# Patient Record
Sex: MALE | Race: WHITE | NOT HISPANIC OR LATINO | URBAN - METROPOLITAN AREA
[De-identification: names, ages, dates, MRNs, and addresses within clinical notes are randomized per-mention and may not be internally consistent; named-entity substitution may affect disease eponyms.]

---

## 2024-09-26 ENCOUNTER — HOSPITAL ENCOUNTER (EMERGENCY)
Facility: HOSPITAL | Age: 44
Discharge: OTHER INSTITUTION | End: 2024-09-26
Attending: EMERGENCY MEDICINE
Payer: COMMERCIAL

## 2024-09-26 ENCOUNTER — APPOINTMENT (OUTPATIENT)
Dept: CARDIOLOGY | Facility: HOSPITAL | Age: 44
End: 2024-09-26
Payer: COMMERCIAL

## 2024-09-26 ENCOUNTER — HOSPITAL ENCOUNTER (INPATIENT)
Facility: HOSPITAL | Age: 44
End: 2024-09-26
Attending: PSYCHIATRY & NEUROLOGY | Admitting: PSYCHIATRY & NEUROLOGY
Payer: COMMERCIAL

## 2024-09-26 ENCOUNTER — HOSPITAL ENCOUNTER (INPATIENT)
Facility: HOSPITAL | Age: 44
DRG: 885 | End: 2024-09-26
Attending: PSYCHIATRY & NEUROLOGY | Admitting: PSYCHIATRY & NEUROLOGY
Payer: COMMERCIAL

## 2024-09-26 ENCOUNTER — APPOINTMENT (OUTPATIENT)
Dept: RADIOLOGY | Facility: HOSPITAL | Age: 44
End: 2024-09-26
Payer: COMMERCIAL

## 2024-09-26 VITALS
HEIGHT: 72 IN | WEIGHT: 220 LBS | BODY MASS INDEX: 29.8 KG/M2 | DIASTOLIC BLOOD PRESSURE: 89 MMHG | RESPIRATION RATE: 16 BRPM | SYSTOLIC BLOOD PRESSURE: 124 MMHG | HEART RATE: 81 BPM | TEMPERATURE: 97.3 F | OXYGEN SATURATION: 96 %

## 2024-09-26 DIAGNOSIS — F22 PARANOIA (MULTI): ICD-10-CM

## 2024-09-26 DIAGNOSIS — F43.12 CHRONIC POST-TRAUMATIC STRESS DISORDER: ICD-10-CM

## 2024-09-26 DIAGNOSIS — F17.290 OTHER TOBACCO PRODUCT NICOTINE DEPENDENCE, UNCOMPLICATED: ICD-10-CM

## 2024-09-26 DIAGNOSIS — F22 PARANOIA (MULTI): Primary | ICD-10-CM

## 2024-09-26 DIAGNOSIS — F13.20 BENZODIAZEPINE DEPENDENCE (MULTI): Primary | ICD-10-CM

## 2024-09-26 LAB
ALBUMIN SERPL BCP-MCNC: 4.7 G/DL (ref 3.4–5)
ALP SERPL-CCNC: 43 U/L (ref 33–120)
ALT SERPL W P-5'-P-CCNC: 49 U/L (ref 10–52)
AMPHETAMINES UR QL SCN: ABNORMAL
ANION GAP SERPL CALC-SCNC: 21 MMOL/L (ref 10–20)
APAP SERPL-MCNC: <10 UG/ML
AST SERPL W P-5'-P-CCNC: 135 U/L (ref 9–39)
BARBITURATES UR QL SCN: ABNORMAL
BASOPHILS # BLD AUTO: 0.05 X10*3/UL (ref 0–0.1)
BASOPHILS NFR BLD AUTO: 0.5 %
BENZODIAZ UR QL SCN: ABNORMAL
BILIRUB SERPL-MCNC: 1.2 MG/DL (ref 0–1.2)
BUN SERPL-MCNC: 8 MG/DL (ref 6–23)
BZE UR QL SCN: ABNORMAL
CALCIUM SERPL-MCNC: 8.8 MG/DL (ref 8.6–10.3)
CANNABINOIDS UR QL SCN: ABNORMAL
CARDIAC TROPONIN I PNL SERPL HS: 10 NG/L (ref 0–20)
CARDIAC TROPONIN I PNL SERPL HS: 9 NG/L (ref 0–20)
CHLORIDE SERPL-SCNC: 101 MMOL/L (ref 98–107)
CO2 SERPL-SCNC: 18 MMOL/L (ref 21–32)
CREAT SERPL-MCNC: 0.85 MG/DL (ref 0.5–1.3)
EGFRCR SERPLBLD CKD-EPI 2021: >90 ML/MIN/1.73M*2
EOSINOPHIL # BLD AUTO: 0.05 X10*3/UL (ref 0–0.7)
EOSINOPHIL NFR BLD AUTO: 0.5 %
ERYTHROCYTE [DISTWIDTH] IN BLOOD BY AUTOMATED COUNT: 12.2 % (ref 11.5–14.5)
ETHANOL SERPL-MCNC: <10 MG/DL
FENTANYL+NORFENTANYL UR QL SCN: ABNORMAL
GLUCOSE SERPL-MCNC: 97 MG/DL (ref 74–99)
HCT VFR BLD AUTO: 40 % (ref 41–52)
HGB BLD-MCNC: 13.7 G/DL (ref 13.5–17.5)
IMM GRANULOCYTES # BLD AUTO: 0.03 X10*3/UL (ref 0–0.7)
IMM GRANULOCYTES NFR BLD AUTO: 0.3 % (ref 0–0.9)
LYMPHOCYTES # BLD AUTO: 2.1 X10*3/UL (ref 1.2–4.8)
LYMPHOCYTES NFR BLD AUTO: 21.5 %
MAGNESIUM SERPL-MCNC: 1.8 MG/DL (ref 1.6–2.4)
MCH RBC QN AUTO: 29.2 PG (ref 26–34)
MCHC RBC AUTO-ENTMCNC: 34.3 G/DL (ref 32–36)
MCV RBC AUTO: 85 FL (ref 80–100)
METHADONE UR QL SCN: ABNORMAL
MONOCYTES # BLD AUTO: 1.21 X10*3/UL (ref 0.1–1)
MONOCYTES NFR BLD AUTO: 12.4 %
NEUTROPHILS # BLD AUTO: 6.35 X10*3/UL (ref 1.2–7.7)
NEUTROPHILS NFR BLD AUTO: 64.8 %
NRBC BLD-RTO: 0 /100 WBCS (ref 0–0)
OPIATES UR QL SCN: ABNORMAL
OXYCODONE+OXYMORPHONE UR QL SCN: ABNORMAL
PCP UR QL SCN: ABNORMAL
PLATELET # BLD AUTO: 214 X10*3/UL (ref 150–450)
POTASSIUM SERPL-SCNC: 2.9 MMOL/L (ref 3.5–5.3)
POTASSIUM SERPL-SCNC: 3.7 MMOL/L (ref 3.5–5.3)
PROT SERPL-MCNC: 6.6 G/DL (ref 6.4–8.2)
RBC # BLD AUTO: 4.69 X10*6/UL (ref 4.5–5.9)
SALICYLATES SERPL-MCNC: <3 MG/DL
SODIUM SERPL-SCNC: 137 MMOL/L (ref 136–145)
TSH SERPL-ACNC: 0.85 MIU/L (ref 0.44–3.98)
WBC # BLD AUTO: 9.8 X10*3/UL (ref 4.4–11.3)

## 2024-09-26 PROCEDURE — 70450 CT HEAD/BRAIN W/O DYE: CPT

## 2024-09-26 PROCEDURE — 85025 COMPLETE CBC W/AUTO DIFF WBC: CPT | Performed by: EMERGENCY MEDICINE

## 2024-09-26 PROCEDURE — 2500000001 HC RX 250 WO HCPCS SELF ADMINISTERED DRUGS (ALT 637 FOR MEDICARE OP): Performed by: EMERGENCY MEDICINE

## 2024-09-26 PROCEDURE — 36415 COLL VENOUS BLD VENIPUNCTURE: CPT | Performed by: EMERGENCY MEDICINE

## 2024-09-26 PROCEDURE — 84484 ASSAY OF TROPONIN QUANT: CPT | Performed by: EMERGENCY MEDICINE

## 2024-09-26 PROCEDURE — 99285 EMERGENCY DEPT VISIT HI MDM: CPT | Mod: 25

## 2024-09-26 PROCEDURE — 70450 CT HEAD/BRAIN W/O DYE: CPT | Performed by: RADIOLOGY

## 2024-09-26 PROCEDURE — 96366 THER/PROPH/DIAG IV INF ADDON: CPT

## 2024-09-26 PROCEDURE — 93005 ELECTROCARDIOGRAM TRACING: CPT

## 2024-09-26 PROCEDURE — 99285 EMERGENCY DEPT VISIT HI MDM: CPT | Performed by: EMERGENCY MEDICINE

## 2024-09-26 PROCEDURE — 80307 DRUG TEST PRSMV CHEM ANLYZR: CPT | Performed by: EMERGENCY MEDICINE

## 2024-09-26 PROCEDURE — 83735 ASSAY OF MAGNESIUM: CPT | Performed by: EMERGENCY MEDICINE

## 2024-09-26 PROCEDURE — 2500000004 HC RX 250 GENERAL PHARMACY W/ HCPCS (ALT 636 FOR OP/ED): Performed by: EMERGENCY MEDICINE

## 2024-09-26 PROCEDURE — 1240000001 HC SEMI-PRIVATE BH ROOM DAILY

## 2024-09-26 PROCEDURE — 84443 ASSAY THYROID STIM HORMONE: CPT | Performed by: EMERGENCY MEDICINE

## 2024-09-26 PROCEDURE — 84075 ASSAY ALKALINE PHOSPHATASE: CPT | Performed by: EMERGENCY MEDICINE

## 2024-09-26 PROCEDURE — 84132 ASSAY OF SERUM POTASSIUM: CPT | Performed by: EMERGENCY MEDICINE

## 2024-09-26 PROCEDURE — 96365 THER/PROPH/DIAG IV INF INIT: CPT

## 2024-09-26 PROCEDURE — 93010 ELECTROCARDIOGRAM REPORT: CPT | Performed by: EMERGENCY MEDICINE

## 2024-09-26 PROCEDURE — 96375 TX/PRO/DX INJ NEW DRUG ADDON: CPT

## 2024-09-26 PROCEDURE — 80320 DRUG SCREEN QUANTALCOHOLS: CPT | Performed by: EMERGENCY MEDICINE

## 2024-09-26 RX ORDER — POLYETHYLENE GLYCOL 3350 17 G/17G
17 POWDER, FOR SOLUTION ORAL DAILY PRN
Status: DISCONTINUED | OUTPATIENT
Start: 2024-09-26 | End: 2024-09-30 | Stop reason: HOSPADM

## 2024-09-26 RX ORDER — ALUMINUM HYDROXIDE, MAGNESIUM HYDROXIDE, AND SIMETHICONE 1200; 120; 1200 MG/30ML; MG/30ML; MG/30ML
30 SUSPENSION ORAL EVERY 6 HOURS PRN
Status: DISCONTINUED | OUTPATIENT
Start: 2024-09-26 | End: 2024-09-30 | Stop reason: HOSPADM

## 2024-09-26 RX ORDER — TRAZODONE HYDROCHLORIDE 50 MG/1
75 TABLET ORAL NIGHTLY PRN
Status: DISCONTINUED | OUTPATIENT
Start: 2024-09-26 | End: 2024-09-27

## 2024-09-26 RX ORDER — HALOPERIDOL 5 MG/ML
5 INJECTION INTRAMUSCULAR EVERY 6 HOURS PRN
Status: DISCONTINUED | OUTPATIENT
Start: 2024-09-26 | End: 2024-09-30 | Stop reason: HOSPADM

## 2024-09-26 RX ORDER — HALOPERIDOL 5 MG/1
5 TABLET ORAL EVERY 6 HOURS PRN
Status: DISCONTINUED | OUTPATIENT
Start: 2024-09-26 | End: 2024-09-30 | Stop reason: HOSPADM

## 2024-09-26 RX ORDER — ALPRAZOLAM 1 MG/1
1 TABLET ORAL ONCE
Status: COMPLETED | OUTPATIENT
Start: 2024-09-26 | End: 2024-09-26

## 2024-09-26 RX ORDER — HALOPERIDOL 5 MG/ML
10 INJECTION INTRAMUSCULAR EVERY 6 HOURS PRN
Status: DISCONTINUED | OUTPATIENT
Start: 2024-09-26 | End: 2024-09-30 | Stop reason: HOSPADM

## 2024-09-26 RX ORDER — HYDROXYZINE HYDROCHLORIDE 25 MG/1
50 TABLET, FILM COATED ORAL EVERY 6 HOURS PRN
Status: DISCONTINUED | OUTPATIENT
Start: 2024-09-26 | End: 2024-09-30 | Stop reason: HOSPADM

## 2024-09-26 RX ORDER — ACETAMINOPHEN 500 MG
5 TABLET ORAL NIGHTLY PRN
Status: DISCONTINUED | OUTPATIENT
Start: 2024-09-26 | End: 2024-09-30 | Stop reason: HOSPADM

## 2024-09-26 RX ORDER — POTASSIUM CHLORIDE 14.9 MG/ML
20 INJECTION INTRAVENOUS ONCE
Status: COMPLETED | OUTPATIENT
Start: 2024-09-26 | End: 2024-09-26

## 2024-09-26 RX ORDER — DIPHENHYDRAMINE HCL 50 MG
50 CAPSULE ORAL EVERY 6 HOURS PRN
Status: DISCONTINUED | OUTPATIENT
Start: 2024-09-26 | End: 2024-09-30 | Stop reason: HOSPADM

## 2024-09-26 RX ORDER — HALOPERIDOL 5 MG/1
10 TABLET ORAL EVERY 6 HOURS PRN
Status: DISCONTINUED | OUTPATIENT
Start: 2024-09-26 | End: 2024-09-30 | Stop reason: HOSPADM

## 2024-09-26 RX ORDER — HALOPERIDOL 5 MG/ML
5 INJECTION INTRAMUSCULAR ONCE
Status: DISCONTINUED | OUTPATIENT
Start: 2024-09-26 | End: 2024-09-26 | Stop reason: HOSPADM

## 2024-09-26 RX ORDER — MICONAZOLE NITRATE 2 %
4 CREAM (GRAM) TOPICAL EVERY 2 HOUR PRN
Status: DISCONTINUED | OUTPATIENT
Start: 2024-09-26 | End: 2024-09-30 | Stop reason: HOSPADM

## 2024-09-26 RX ORDER — IBUPROFEN 600 MG/1
600 TABLET ORAL EVERY 8 HOURS PRN
Status: DISCONTINUED | OUTPATIENT
Start: 2024-09-26 | End: 2024-09-30 | Stop reason: HOSPADM

## 2024-09-26 RX ORDER — DOCUSATE SODIUM 100 MG/1
100 CAPSULE, LIQUID FILLED ORAL 2 TIMES DAILY PRN
Status: DISCONTINUED | OUTPATIENT
Start: 2024-09-26 | End: 2024-09-30 | Stop reason: HOSPADM

## 2024-09-26 RX ORDER — DIPHENHYDRAMINE HYDROCHLORIDE 50 MG/ML
50 INJECTION INTRAMUSCULAR; INTRAVENOUS ONCE AS NEEDED
Status: DISCONTINUED | OUTPATIENT
Start: 2024-09-26 | End: 2024-09-30 | Stop reason: HOSPADM

## 2024-09-26 RX ORDER — LORAZEPAM 2 MG/ML
2 INJECTION INTRAMUSCULAR ONCE
Status: COMPLETED | OUTPATIENT
Start: 2024-09-26 | End: 2024-09-26

## 2024-09-26 RX ORDER — LORAZEPAM 1 MG/1
2 TABLET ORAL EVERY 6 HOURS PRN
Status: DISCONTINUED | OUTPATIENT
Start: 2024-09-26 | End: 2024-09-27

## 2024-09-26 RX ORDER — LORAZEPAM 2 MG/ML
2 INJECTION INTRAMUSCULAR EVERY 6 HOURS PRN
Status: DISCONTINUED | OUTPATIENT
Start: 2024-09-26 | End: 2024-09-27

## 2024-09-26 SDOH — HEALTH STABILITY: MENTAL HEALTH: HAVE YOU EVER TRIED TO KILL YOURSELF?: NO

## 2024-09-26 SDOH — SOCIAL STABILITY: SOCIAL INSECURITY
WITHIN THE LAST YEAR, HAVE YOU BEEN RAPED OR FORCED TO HAVE ANY KIND OF SEXUAL ACTIVITY BY YOUR PARTNER OR EX-PARTNER?: NO

## 2024-09-26 SDOH — SOCIAL STABILITY: SOCIAL NETWORK: HOW OFTEN DO YOU GET TOGETHER WITH FRIENDS OR RELATIVES?: MORE THAN THREE TIMES A WEEK

## 2024-09-26 SDOH — HEALTH STABILITY: MENTAL HEALTH: HAVE YOU ACTUALLY HAD ANY THOUGHTS OF KILLING YOURSELF?: NO

## 2024-09-26 SDOH — HEALTH STABILITY: PHYSICAL HEALTH
HOW OFTEN DO YOU NEED TO HAVE SOMEONE HELP YOU WHEN YOU READ INSTRUCTIONS, PAMPHLETS, OR OTHER WRITTEN MATERIAL FROM YOUR DOCTOR OR PHARMACY?: NEVER

## 2024-09-26 SDOH — HEALTH STABILITY: MENTAL HEALTH: BEHAVIORAL HEALTH(WDL): EXCEPTIONS TO WDL

## 2024-09-26 SDOH — HEALTH STABILITY: MENTAL HEALTH: BEHAVIORAL HEALTH(WDL): WITHIN DEFINED LIMITS

## 2024-09-26 SDOH — HEALTH STABILITY: MENTAL HEALTH: IN THE PAST WEEK, HAVE YOU BEEN HAVING THOUGHTS ABOUT KILLING YOURSELF?: NO

## 2024-09-26 SDOH — HEALTH STABILITY: MENTAL HEALTH: DELUSIONS: PARANOID

## 2024-09-26 SDOH — HEALTH STABILITY: MENTAL HEALTH
OTHER SUICIDE PRECAUTIONS INCLUDE: PATIENT PLACED IN AN EASILY OBSERVABLE ROOM WITH DOOR/CURTAIN REMAINING OPEN;PATIENT PLACED IN GOWN (SNAPS OR PAPER GOWNS PREFERRED) AND WANDED;REMAINING RISKS IDENTIFIED AND MITIGATED;PATIENT PLACED IN PSYCH SAFE ROOM (IF AVAILABLE);PROVIDER NOTIFIED;EL

## 2024-09-26 SDOH — SOCIAL STABILITY: SOCIAL INSECURITY: WITHIN THE LAST YEAR, HAVE YOU BEEN HUMILIATED OR EMOTIONALLY ABUSED IN OTHER WAYS BY YOUR PARTNER OR EX-PARTNER?: NO

## 2024-09-26 SDOH — HEALTH STABILITY: MENTAL HEALTH: DELUSIONS: OTHER (COMMENT)

## 2024-09-26 SDOH — HEALTH STABILITY: PHYSICAL HEALTH: ON AVERAGE, HOW MANY MINUTES DO YOU ENGAGE IN EXERCISE AT THIS LEVEL?: 0 MIN

## 2024-09-26 SDOH — HEALTH STABILITY: MENTAL HEALTH: BEHAVIORS/MOOD: SLEEPING

## 2024-09-26 SDOH — HEALTH STABILITY: MENTAL HEALTH: SLEEP PATTERN: INSOMNIA;DIFFICULTY FALLING ASLEEP

## 2024-09-26 SDOH — ECONOMIC STABILITY: FOOD INSECURITY: WITHIN THE PAST 12 MONTHS, YOU WORRIED THAT YOUR FOOD WOULD RUN OUT BEFORE YOU GOT MONEY TO BUY MORE.: NEVER TRUE

## 2024-09-26 SDOH — SOCIAL STABILITY: SOCIAL NETWORK
IN A TYPICAL WEEK, HOW MANY TIMES DO YOU TALK ON THE PHONE WITH FAMILY, FRIENDS, OR NEIGHBORS?: MORE THAN THREE TIMES A WEEK

## 2024-09-26 SDOH — SOCIAL STABILITY: SOCIAL NETWORK: HOW OFTEN DO YOU ATTEND MEETINGS OF THE CLUBS OR ORGANIZATIONS YOU BELONG TO?: MORE THAN 4 TIMES PER YEAR

## 2024-09-26 SDOH — SOCIAL STABILITY: SOCIAL INSECURITY
WITHIN THE LAST YEAR, HAVE YOU BEEN KICKED, HIT, SLAPPED, OR OTHERWISE PHYSICALLY HURT BY YOUR PARTNER OR EX-PARTNER?: NO

## 2024-09-26 SDOH — ECONOMIC STABILITY: INCOME INSECURITY: IN THE PAST 12 MONTHS, HAS THE ELECTRIC, GAS, OIL, OR WATER COMPANY THREATENED TO SHUT OFF SERVICE IN YOUR HOME?: NO

## 2024-09-26 SDOH — HEALTH STABILITY: MENTAL HEALTH: SUICIDE ASSESSMENT: ADULT (C-SSRS)

## 2024-09-26 SDOH — HEALTH STABILITY: MENTAL HEALTH: IN THE PAST FEW WEEKS, HAVE YOU FELT THAT YOU OR YOUR FAMILY WOULD BE BETTER OFF IF YOU WERE DEAD?: NO

## 2024-09-26 SDOH — SOCIAL STABILITY: SOCIAL NETWORK: HOW OFTEN DO YOU ATTEND CHURCH OR RELIGIOUS SERVICES?: MORE THAN 4 TIMES PER YEAR

## 2024-09-26 SDOH — SOCIAL STABILITY: SOCIAL NETWORK: HOW OFTEN DO YOU ATTENT MEETINGS OF THE CLUB OR ORGANIZATION YOU BELONG TO?: MORE THAN 4 TIMES PER YEAR

## 2024-09-26 SDOH — HEALTH STABILITY: MENTAL HEALTH: HAVE YOU WISHED YOU WERE DEAD OR WISHED YOU COULD GO TO SLEEP AND NOT WAKE UP?: NO

## 2024-09-26 SDOH — ECONOMIC STABILITY: FOOD INSECURITY: WITHIN THE PAST 12 MONTHS, THE FOOD YOU BOUGHT JUST DIDN'T LAST AND YOU DIDN'T HAVE MONEY TO GET MORE.: NEVER TRUE

## 2024-09-26 SDOH — HEALTH STABILITY: MENTAL HEALTH: ARE YOU HAVING THOUGHTS OF KILLING YOURSELF RIGHT NOW?: NO

## 2024-09-26 SDOH — HEALTH STABILITY: MENTAL HEALTH: IN THE PAST FEW WEEKS, HAVE YOU WISHED YOU WERE DEAD?: NO

## 2024-09-26 SDOH — HEALTH STABILITY: MENTAL HEALTH: HAVE YOU EVER DONE ANYTHING, STARTED TO DO ANYTHING, OR PREPARED TO DO ANYTHING TO END YOUR LIFE?: NO

## 2024-09-26 SDOH — HEALTH STABILITY: MENTAL HEALTH
DO YOU FEEL STRESS - TENSE, RESTLESS, NERVOUS, OR ANXIOUS, OR UNABLE TO SLEEP AT NIGHT BECAUSE YOUR MIND IS TROUBLED ALL THE TIME - THESE DAYS?: TO SOME EXTENT

## 2024-09-26 SDOH — HEALTH STABILITY: MENTAL HEALTH

## 2024-09-26 SDOH — HEALTH STABILITY: MENTAL HEALTH: BEHAVIORS/MOOD: CALM

## 2024-09-26 SDOH — HEALTH STABILITY: MENTAL HEALTH: CONTENT: UNREMARKABLE

## 2024-09-26 SDOH — SOCIAL STABILITY: SOCIAL NETWORK
DO YOU BELONG TO ANY CLUBS OR ORGANIZATIONS SUCH AS CHURCH GROUPS UNIONS, FRATERNAL OR ATHLETIC GROUPS, OR SCHOOL GROUPS?: YES

## 2024-09-26 SDOH — HEALTH STABILITY: MENTAL HEALTH: NEEDS EXPRESSED: DENIES

## 2024-09-26 SDOH — HEALTH STABILITY: MENTAL HEALTH
STRESS IS WHEN SOMEONE FEELS TENSE, NERVOUS, ANXIOUS, OR CAN'T SLEEP AT NIGHT BECAUSE THEIR MIND IS TROUBLED. HOW STRESSED ARE YOU?: TO SOME EXTENT

## 2024-09-26 SDOH — HEALTH STABILITY: MENTAL HEALTH: BEHAVIORS/MOOD: DELUSIONS;FLIGHT OF IDEAS;HALLUCINATIONS;IMPULSIVE;PARANOID;RESTLESS

## 2024-09-26 SDOH — SOCIAL STABILITY: SOCIAL INSECURITY: WITHIN THE LAST YEAR, HAVE YOU BEEN AFRAID OF YOUR PARTNER OR EX-PARTNER?: NO

## 2024-09-26 SDOH — SOCIAL STABILITY: SOCIAL NETWORK
DO YOU BELONG TO ANY CLUBS OR ORGANIZATIONS SUCH AS CHURCH GROUPS, UNIONS, FRATERNAL OR ATHLETIC GROUPS, OR SCHOOL GROUPS?: YES

## 2024-09-26 SDOH — SOCIAL STABILITY: SOCIAL INSECURITY: ARE YOU MARRIED, WIDOWED, DIVORCED, SEPARATED, NEVER MARRIED, OR LIVING WITH A PARTNER?: NEVER MARRIED

## 2024-09-26 SDOH — SOCIAL STABILITY: SOCIAL NETWORK: PARENT/GUARDIAN/SIGNIFICANT OTHER INVOLVEMENT: NO INVOLVEMENT

## 2024-09-26 SDOH — HEALTH STABILITY: MENTAL HEALTH: SLEEP PATTERN: DIFFICULTY FALLING ASLEEP

## 2024-09-26 SDOH — ECONOMIC STABILITY: INCOME INSECURITY: IN THE PAST 12 MONTHS HAS THE ELECTRIC, GAS, OIL, OR WATER COMPANY THREATENED TO SHUT OFF SERVICES IN YOUR HOME?: NO

## 2024-09-26 SDOH — SOCIAL STABILITY: SOCIAL INSECURITY: FAMILY BEHAVIORS: UNABLE TO ASSESS

## 2024-09-26 SDOH — SOCIAL STABILITY: SOCIAL NETWORK: ARE YOU MARRIED, WIDOWED, DIVORCED, SEPARATED, NEVER MARRIED, OR LIVING WITH A PARTNER?: NEVER MARRIED

## 2024-09-26 SDOH — ECONOMIC STABILITY: FOOD INSECURITY: WITHIN THE PAST 12 MONTHS, YOU WORRIED THAT YOUR FOOD WOULD RUN OUT BEFORE YOU GOT THE MONEY TO BUY MORE.: NEVER TRUE

## 2024-09-26 SDOH — ECONOMIC STABILITY: HOUSING INSECURITY: FEELS SAFE LIVING IN HOME: NO

## 2024-09-26 SDOH — HEALTH STABILITY: PHYSICAL HEALTH: ON AVERAGE, HOW MANY DAYS PER WEEK DO YOU ENGAGE IN MODERATE TO STRENUOUS EXERCISE (LIKE A BRISK WALK)?: 0 DAYS

## 2024-09-26 SDOH — HEALTH STABILITY: MENTAL HEALTH: FOR HIGH RISK PATIENTS: 1:1 PATIENT OBSERVER AT ALL TIMES;ALL INTERVENTIONS ABOVE, PLUS:

## 2024-09-26 SDOH — HEALTH STABILITY: MENTAL HEALTH: CONTENT: DELUSIONS;OBSESSIONS;PHOBIAS;MAGICAL THINKING

## 2024-09-26 SDOH — HEALTH STABILITY: MENTAL HEALTH: DEPRESSION SYMPTOMS: SLEEP DISTURBANCE;APPETITE CHANGE

## 2024-09-26 SDOH — HEALTH STABILITY: MENTAL HEALTH: SUICIDAL BEHAVIOR (LIFETIME): NO

## 2024-09-26 SDOH — HEALTH STABILITY: MENTAL HEALTH: NON-SPECIFIC ACTIVE SUICIDAL THOUGHTS (PAST 1 MONTH): NO

## 2024-09-26 SDOH — SOCIAL STABILITY: SOCIAL NETWORK: VISITOR BEHAVIORS: UNABLE TO ASSESS

## 2024-09-26 SDOH — HEALTH STABILITY: MENTAL HEALTH: BEHAVIORS/MOOD: ANXIOUS;PARANOID

## 2024-09-26 SDOH — SOCIAL STABILITY: SOCIAL NETWORK: EMOTIONAL SUPPORT GIVEN: REASSURE

## 2024-09-26 SDOH — HEALTH STABILITY: MENTAL HEALTH: CONTENT: PREOCCUPATION

## 2024-09-26 SDOH — HEALTH STABILITY: MENTAL HEALTH: ANXIETY SYMPTOMS: GENERALIZED;PANIC ATTACK

## 2024-09-26 SDOH — HEALTH STABILITY: MENTAL HEALTH: BEHAVIORS/MOOD: AGITATED;FLIGHT OF IDEAS

## 2024-09-26 SDOH — HEALTH STABILITY: MENTAL HEALTH: WISH TO BE DEAD (PAST 1 MONTH): NO

## 2024-09-26 SDOH — HEALTH STABILITY: MENTAL HEALTH: BEHAVIORS/MOOD: ANXIOUS

## 2024-09-26 SDOH — SOCIAL STABILITY: SOCIAL INSECURITY
WITHIN THE LAST YEAR, HAVE TO BEEN RAPED OR FORCED TO HAVE ANY KIND OF SEXUAL ACTIVITY BY YOUR PARTNER OR EX-PARTNER?: NO

## 2024-09-26 ASSESSMENT — PAIN - FUNCTIONAL ASSESSMENT
PAIN_FUNCTIONAL_ASSESSMENT: 0-10

## 2024-09-26 ASSESSMENT — LIFESTYLE VARIABLES
EVER FELT BAD OR GUILTY ABOUT YOUR DRINKING: NO
AUDITORY DISTURBANCES: NOT PRESENT
SUBSTANCE_ABUSE_PAST_12_MONTHS: YES
HAVE PEOPLE ANNOYED YOU BY CRITICIZING YOUR DRINKING: NO
VISUAL DISTURBANCES: NOT PRESENT
ANXIETY: NO ANXIETY, AT EASE
NAUSEA AND VOMITING: NO NAUSEA AND NO VOMITING
AGITATION: NORMAL ACTIVITY
PRESCIPTION_ABUSE_PAST_12_MONTHS: YES
CIWA TOTAL SCORE: 0
TREMOR: NO TREMOR
HEADACHE, FULLNESS IN HEAD: NOT PRESENT
TOTAL_SCORE: 0
EVER HAD A DRINK FIRST THING IN THE MORNING TO STEADY YOUR NERVES TO GET RID OF A HANGOVER: NO
BLOOD PRESSURE: 132/85
HAVE YOU EVER FELT YOU SHOULD CUT DOWN ON YOUR DRINKING: NO
PAROXYSMAL SWEATS: NO SWEAT VISIBLE
PULSE: 64
TOTAL SCORE: 0
ORIENTATION AND CLOUDING OF SENSORIUM: ORIENTED AND CAN DO SERIAL ADDITIONS

## 2024-09-26 ASSESSMENT — ACTIVITIES OF DAILY LIVING (ADL)
PATIENT'S MEMORY ADEQUATE TO SAFELY COMPLETE DAILY ACTIVITIES?: YES
GROOMING: INDEPENDENT
HEARING - LEFT EAR: FUNCTIONAL
FEEDING YOURSELF: INDEPENDENT
WALKS IN HOME: INDEPENDENT
TOILETING: INDEPENDENT
LACK_OF_TRANSPORTATION: YES
BATHING: INDEPENDENT
ADEQUATE_TO_COMPLETE_ADL: YES
DRESSING YOURSELF: INDEPENDENT
HEARING - RIGHT EAR: FUNCTIONAL
JUDGMENT_ADEQUATE_SAFELY_COMPLETE_DAILY_ACTIVITIES: YES

## 2024-09-26 ASSESSMENT — PAIN SCALES - GENERAL
PAINLEVEL_OUTOF10: 0 - NO PAIN

## 2024-09-26 ASSESSMENT — COLUMBIA-SUICIDE SEVERITY RATING SCALE - C-SSRS
1. IN THE PAST MONTH, HAVE YOU WISHED YOU WERE DEAD OR WISHED YOU COULD GO TO SLEEP AND NOT WAKE UP?: NO
1. SINCE LAST CONTACT, HAVE YOU WISHED YOU WERE DEAD OR WISHED YOU COULD GO TO SLEEP AND NOT WAKE UP?: NO
2. HAVE YOU ACTUALLY HAD ANY THOUGHTS OF KILLING YOURSELF?: NO
6. HAVE YOU EVER DONE ANYTHING, STARTED TO DO ANYTHING, OR PREPARED TO DO ANYTHING TO END YOUR LIFE?: NO
6. HAVE YOU EVER DONE ANYTHING, STARTED TO DO ANYTHING, OR PREPARED TO DO ANYTHING TO END YOUR LIFE?: NO
2. HAVE YOU ACTUALLY HAD ANY THOUGHTS OF KILLING YOURSELF?: NO
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
## 2024-09-26 NOTE — PROGRESS NOTES
Emergency Medicine Transition of Care Note.    Patient was signed out to me by Dr. Diallo.  Please see the previous ED provider note for all HPI, PE and MDM up to the time of signout at 7AM. This is in addition to the primary record.    In brief, this patient is a 43-year-old male initially presenting to the ED for manic-like symptoms with a history of bipolar disorder. At the time of signout we were pending EPAT recommendations as he was given potassium for his hypokalemia and has been medically cleared by the prior provider.  EPAT been consulted for paranoia.     I spoke with EPAT.  Plan to place.  Repeat potassium pending.    ** Please excuse any errors in grammar or translation related to this dictation. Voice recognition software was utilized to prepare this document. **       Isaias Lloyd MD  [facility]

## 2024-09-26 NOTE — PROGRESS NOTES
"Marvin Rivas is a 43 y.o. male on day 0 of admission presenting with No Principal Problem: There is no principal problem currently on the Problem List. Please update the Problem List and refresh..    Capacity Assessment Tool    \"Capacity\" is the \"ability\" to make a decision.  The decision in question must be specific (one decision), relevant to a patient's current condition (appropriate), and timely (neither prospective nor retrospective).    Capacity varies based on knowledge base (explanation/understanding of clinical information), cognitive processing, acute psychiatric illness, and other clinical conditions.    In order to be deemed \"capacitated\" to make a single decision at one point in time, a patient must demonstrate all 4 of the following elements:    *Ability to consistently communicate a choice (consistent over time with adequate information)  *Ability to understand the relevant information (accurate knowledge of condition)  *Ability to appreciate the situation and its consequences (risks/benefits, pros/cons)  *Ability to reason about treatment options (without undue influence of a person or condition, eg. suicidality or acute psychosis)      Current Decision    Clinical issue:   Manic/hallucinations/paranoia    Did the appropriate team address relevant information with the patient:  Yes    Date: 9/26/24    If \"NO\" is selected for appropriate team, then please discuss with the appropriate team.  The appropriate team should be encouraged to address relevant information with the patient AND reevaluate capacity when appropriate.    Capacity Evaluation    Patient demonstrates ability to consistently communicate choice:  No     Patient demonstrates ability to understand the relevant information:  No     Patient demonstrates ability to appreciate the situation and its consequences:  No     Patient demonstrates ability to reason about treatment options:  NO    If ANY of the above items are answered \"NO,\" the " patient LACKS CAPACITY for that specific decision at hand, at that specific time.  Further capacity evaluations can be done as needed.

## 2024-09-26 NOTE — ED PROVIDER NOTES
"HPI   Chief Complaint   Patient presents with    Manic Behavior     Patient brought in from ems for manic behavior. States that people are trying to kill him. Admits to taking zanax and mushrooms along with \"other prescription meds'.        This is a 43-year-old male that presents the emergency room with a possible manic episode versus a psychosis.  It is difficult to determine if this is from lack of sleep and noe versus a possible polypharmacy abuse situation.  Initially told us that he was up for 48 hours, cannot sleep, is having racing thoughts and extremely paranoid that someone is trying to kill him and then he also said now that he took Xanax, and hallucinogenic mushrooms and also to drugs that he was not able to tell us about.  He states he is having paranoia about having a heart attack, about [place] spice coming in to kill him here in the emergency room, about his blood pressure, about people trying to steal his gold coins, and he is very tangential as well.  I was able to determine that he does not have any allergies, or heart issues at this time.      At this time, CT scan of the brain is negative.  The patient is medically clear for psychiatric evaluation.  Time 0755              Patient History   No past medical history on file.  No past surgical history on file.  No family history on file.  Social History     Tobacco Use    Smoking status: Never    Smokeless tobacco: Never   Vaping Use    Vaping status: Unknown   Substance Use Topics    Alcohol use: Not on file    Drug use: Not on file       Physical Exam   ED Triage Vitals [09/26/24 0216]   Temperature Heart Rate Respirations BP   36.3 °C (97.3 °F) 71 18 162/73      Pulse Ox Temp Source Heart Rate Source Patient Position   97 % Temporal Monitor Sitting      BP Location FiO2 (%)     Right arm --       Physical Exam  Constitutional:       Comments: Patient alert, speaking, agitated and very tangential.  Very paranoid.  Asking for water to drink. "   HENT:      Head: Normocephalic and atraumatic.      Mouth/Throat:      Mouth: Mucous membranes are moist.      Pharynx: Oropharynx is clear.   Eyes:      Extraocular Movements: Extraocular movements intact.      Conjunctiva/sclera: Conjunctivae normal.      Pupils: Pupils are equal, round, and reactive to light.      Comments: Patient was not able to follow on eye exam however no nystagmus was visualized while watching look around the room.   Cardiovascular:      Rate and Rhythm: Regular rhythm. Tachycardia present.      Pulses: Normal pulses.      Heart sounds: Normal heart sounds.   Pulmonary:      Effort: Pulmonary effort is normal.      Breath sounds: Normal breath sounds.   Abdominal:      General: Abdomen is flat. Bowel sounds are normal.      Palpations: Abdomen is soft.   Musculoskeletal:         General: Normal range of motion.      Cervical back: Normal range of motion and neck supple. No rigidity or tenderness.   Skin:     General: Skin is warm.   Neurological:      General: No focal deficit present.      Mental Status: He is alert.           ED Course & MDM   ED Course as of 09/28/24 1758   u Sep 26, 2024   1300 Patient not going to [place] and instead going to [facility].  Repeat EKG ordered.  Team requesting QTc below 500. [DM]   1405 Patient does not have capacity.  Not allowed to leave the hospital. [DM]   1443 EKG was repeated and demonstrated a QTc interval of 495.  Medically cleared.  Pending placement. [DM]   1540 Patient signed out to me by outgoing physician, Dr. Lloyd.  Patient had been medically [facility]  Patient is excepted to [facility].  [place] slip filled out.  EMTALA form completed. [PS]      ED Course User Index  [DM] Isaias Lloyd MD  [PS] Nikolas Byrne DO         Diagnoses as of 09/28/24 1758   Paranoia (Multi)                 No data recorded     [place] Coma Scale Score: 14 (09/26/24 0211 : Priscila Cade RN)                            Medical Decision Making  After receiving the Ativan, the patient fell asleep for several hours.  When he woke up he was more calm.  He states he did not do any mushrooms this evening, but last time he was this paranoid, and was seeing things like this, he did do mushrooms in [place].  He was placed in a psychiatric facility, and was there for several days.  Again he denies any history of schizophrenia, paranoia or bipolar disorder.  He states he is seeing many psychiatric doctors, and no ones ever told him he has any of that.  They told him he has PTSD, and major depressive disorder only.  I asked him why he was on olanzapine and he was not able to answer but did state he took his Xanax which he is supposed to take, and olanzapine last night.  He took his regular scheduled dose and nothing extra.  He does smoke marijuana at times and believes he may have smoked some last night but again is not 100% sure.    CT scan of the brain will be obtained due to the memory issues to make sure there was not accidental head trauma.  Though he has no signs of bruising or obvious injury.       The patient is otherwise medically clear for EPAT evaluation and placement.      Signed out to Dr. Lloyd for EPAT eval and dispo.      Procedure  Procedures     Sriram Diallo, DO  09/28/24 1800

## 2024-09-26 NOTE — PROGRESS NOTES

## 2024-09-26 NOTE — PROGRESS NOTES
Application for Emergency Admission      Ready for Transfer?  Is the patient medically cleared for transfer to inpatient psychiatry: Yes  Has the patient been accepted to an inpatient psychiatric hospital: Yes    Application for Emergency Admission  IN ACCORDANCE WITH [place] 5122.10 [place]  The Chief Clinical Officer of: [facility] 9/26/2024 .3:38 PM    Reason for Hospitalization  The undersigned has reason to believe that: Marvin Rivas Is a mentally ill person subject to hospitalization by court order under division B Section 5122.01 of the Revised Code, i.e., this person:    1.Yes  Represents a substantial risk of physical harm to self as manifested by evidence of threats of, or attempts at, suicide or serious self-inflicted bodily harm    2.No Represents a substantial risk of physical harm to others as manifested by evidence of recent homicidal or other violent behavior, evidence of recent threats that place another in reasonable fear of violent behavior and serious physical harm, or other evidence of present dangerousness    3.Yes Represents a substantial and immediate risk of serious physical impairment or injury to self as manifested by  evidence that the person is unable to provide for and is not providing for the person's basic physical needs because of the person's mental illness and that appropriate provision for those needs cannot be made  immediately available in the community    4.Yes Would benefit from treatment in a hospital for his mental illness and is in need of such treatment as manifested by evidence of behavior that creates a grave and imminent risk to substantial rights of others or  himself.    5.Yes Would benefit from treatment as manifested by evidence of behavior that indicates all of the following:       (a) The person is unlikely to survive safely in the community without supervision, based on a clinical determination.       (b) The person has a history of lack of compliance with  treatment for mental illness and one of the following applies:      (i) At least twice within the thirty-six months prior to the filing of an affidavit seeking court-ordered treatment of the person under section 5122.111 of the Revised Code, the lack of compliance has been a significant factor in necessitating hospitalization in a hospital or receipt of services in a forensic or other mental health unit of a correctional facility, provided that the thirty-six-month period shall be extended by the length of any hospitalization or incarceration of the person that occurred within the thirty-six-month period.      (ii) Within the forty-eight months prior to the filing of an affidavit seeking court-ordered treatment of the person under section 5122.111 of the Revised Code, the lack of compliance resulted in one or more acts of serious violent behavior toward self or others or threats of, or attempts at, serious physical harm to self or others, provided that the forty-eight-month period shall be extended by the length of any hospitalization or incarceration of the person that occurred within the forty-eight-month period.      (c) The person, as a result of mental illness, is unlikely to voluntarily participate in necessary treatment.       (d) In view of the person's treatment history and current behavior, the person is in need of treatment in order to prevent a relapse or deterioration that would be likely to result in substantial risk of serious harm to the person or others.    (e) Represents a substantial risk of physical harm to self or others if allowed to remain at liberty pending examination.    Therefore, it is requested that said person be admitted to the above named facility.    STATEMENT OF BELIEF    Must be filled out by one of the following: a psychiatrist, licensed physician, licensed clinical psychologist, health or ,  or .  (Statement shall include the circumstances under  which the individual was taken into custody and the reason for the person's belief that hospitalization is necessary. The statement shall also include a reference to efforts made to secure the individual's property at his residence if he was taken into custody there. Every reasonable and appropriate effort should be made to take this person into custody in the least conspicuous manner possible.)    Paranoia with noe.  Requires inpatient psychiatric evaluation and treatment     Nikloas Byrne DO 9/26/2024     _____________________________________________________________   Place of Employment: [facility]    STATEMENT OF OBSERVATION BY PSYCHIATRIST, LICENSED PHYSICIAN, OR LICENSED CLINICAL PSYCHOLOGIST, IF APPLICABLE    Place of Observation (e.g., [facility], [facility] hospital, office, emergency facility)  (If applicable, please complete)    Nikolas Byrne DO 9/26/2024    _____________________________________________________________

## 2024-09-26 NOTE — SIGNIFICANT EVENT
Application for Emergency Admission      Ready for Transfer?  Is the patient medically cleared for transfer to inpatient psychiatry: Yes  Has the patient been accepted to an inpatient psychiatric hospital: Yes    Application for Emergency Admission  IN ACCORDANCE WITH SE[place] 5122.10 [place]  The Chief Clinical Officer of: [facility] 9/26/2024 .12:17 PM    Reason for Hospitalization  The undersigned has reason to believe that: Marvin Rivas Is a mentally ill person subject to hospitalization by court order under division B Section 5122.01 of the Revised Code, i.e., this person:    1.Yes  Represents a substantial risk of physical harm to self as manifested by evidence of threats of, or attempts at, suicide or serious self-inflicted bodily harm    2.No Represents a substantial risk of physical harm to others as manifested by evidence of recent homicidal or other violent behavior, evidence of recent threats that place another in reasonable fear of violent behavior and serious physical harm, or other evidence of present dangerousness    3.Yes Represents a substantial and immediate risk of serious physical impairment or injury to self as manifested by  evidence that the person is unable to provide for and is not providing for the person's basic physical needs because of the person's mental illness and that appropriate provision for those needs cannot be made  immediately available in the community    4.Yes Would benefit from treatment in a hospital for his mental illness and is in need of such treatment as manifested by evidence of behavior that creates a grave and imminent risk to substantial rights of others or  himself.    5.Yes Would benefit from treatment as manifested by evidence of behavior that indicates all of the following:       (a) The person is unlikely to survive safely in the community without supervision, based on a clinical determination.       (b) The person has a history of lack of compliance with treatment  for mental illness and one of the following applies:      (i) At least twice within the thirty-six months prior to the filing of an affidavit seeking court-ordered treatment of the person under section 5122.111 of the Revised Code, the lack of compliance has been a significant factor in necessitating hospitalization in a hospital or receipt of services in a forensic or other mental health unit of a correctional facility, provided that the thirty-six-month period shall be extended by the length of any hospitalization or incarceration of the person that occurred within the thirty-six-month period.      (ii) Within the forty-eight months prior to the filing of an affidavit seeking court-ordered treatment of the person under section 5122.111 of the Revised Code, the lack of compliance resulted in one or more acts of serious violent behavior toward self or others or threats of, or attempts at, serious physical harm to self or others, provided that the forty-eight-month period shall be extended by the length of any hospitalization or incarceration of the person that occurred within the forty-eight-month period.      (c) The person, as a result of mental illness, is unlikely to voluntarily participate in necessary treatment.       (d) In view of the person's treatment history and current behavior, the person is in need of treatment in order to prevent a relapse or deterioration that would be likely to result in substantial risk of serious harm to the person or others.    (e) Represents a substantial risk of physical harm to self or others if allowed to remain at liberty pending examination.    Therefore, it is requested that said person be admitted to the above named facility.    STATEMENT OF BELIEF    Must be filled out by one of the following: a psychiatrist, licensed physician, licensed clinical psychologist, health or ,  or .  (Statement shall include the circumstances under which the  individual was taken into custody and the reason for the person's belief that hospitalization is necessary. The statement shall also include a reference to efforts made to secure the individual's property at his residence if he was taken into custody there. Every reasonable and appropriate effort should be made to take this person into custody in the least conspicuous manner possible.)    Paranoia with manic symptoms needing inpatient placement. Medically cleared.      Isaias Lloyd MD 9/26/2024     _____________________________________________________________   Place of Employment: [facility]    STATEMENT OF OBSERVATION BY PSYCHIATRIST, LICENSED PHYSICIAN, OR LICENSED CLINICAL PSYCHOLOGIST, IF APPLICABLE    Place of Observation (e.g., [facility] mental [facility] center, general hospital, office, emergency facility)  (If applicable, please complete)    Isaias Lloyd MD 9/26/2024    _____________________________________________________________

## 2024-09-26 NOTE — PROGRESS NOTES
EPAT - Social Work Psychiatric Assessment    Arrival Details  Mode of Arrival: Ambulatory  Admission Source: Home  Admission Type: Involuntary  EPAT Assessment Start Date: 09/26/24  EPAT Assessment Start Time: 0848  Name of : Adriano Charlene    History of Present Illness  Admission Reason: Paranoia  HPI: Patient is a 43 year old male who came to the ED Paranoid. The patient reports he is being poisoned and the Mafia is trying to kill him. He reports no sleep for the past 3 days. He appears anxious and stressed. A review of his Triage, Provider and [place] was conducted.    SW Readmission Information   Readmission within 30 Days: No    Psychiatric Symptoms  Anxiety Symptoms: Generalized, Panic attack  Depression Symptoms: Sleep disturbance, Appetite change  Zoraida Symptoms: Flight of ideas, Pressured speech, Increased energy    Psychosis Symptoms  Hallucination Type: No problems reported or observed.  Delusion Type: Paranoid    Additional Symptoms - Adult  Generalized Anxiety Disorder: Excessive anxiety/worry  Obsessive Compulsive Disorder: No problems reported or observed.  Panic Attack: No problems reported or observed.  Post Traumatic Stress Disorder: No problems reported or observed.  Delirium: No problems reported or observed.  Review of Symptoms Comments: Please see above    Past Psychiatric History/Meds/Treatments  Past Psychiatric History: Patient has a history of Depression, Anxiety. He sees a Psychiatrist monthly tele psych and takes medications. He does not have any other providers. He has one past inpatient stay one year ago when he lived in [place]. He has no known substance treatment history and no history of self harm.  Past Psychiatric Meds/Treatments: see med list.  Past Violence/Victimization History: patient denies    Current Mental Health Contacts   Name/Phone Number: none   Last Appointment Date: none  Provider Name/Phone Number: Psychiatrist/[place]  Provider Last  Appointment Date: Sees monthly on the computer    Support System: Immediate family    Living Arrangement: House    Home Safety  Feels Safe Living in Home: No    Income Information  Employment Status: Employed  Income Source: Employed  Shift Worked: First Shift    [place] Service/Education History  Current or Previous  Service: None  Education Level: College  History of Learning Problems: No  History of School Behavior Problems: No  School History: see above    Social/Cultural History  Social History: Patient is his own guardian. Recent stressors his Paranoia, recently bought a home.  Important Activities: Family    Legal  Legal Concerns: none    Drug Screening  Have you used any substances (canabis, cocaine, heroin, hallucinogens, inhalants, etc.) in the past 12 months?: Yes  Have you used any prescription drugs other than prescribed in the past 12 months?: Yes  Is a toxicology screen needed?: Yes    Stage of Change  Stage of Change: Precontemplation  History of Treatment: AA/NA meetings  Type of Treatment Offered: AA/NA meeting resource  Treatment Offered: Declined    Behavioral Health  Behavioral Health(WDL): Exceptions to WDL  Behaviors/Mood: Anxious, Flight of ideas, Hyper-verbal, Paranoid, Restless  Affect: Inconsistent with mood  Parent/Guardian/Significant Other Involvement: Attentive to patient needs  Family Behaviors: Appropriate for situation  Visitor Behaviors: Appropriate for situation    Orientation  Orientation Level: Oriented X4    General Appearance  Motor Activity: Restlessness  Speech Pattern: Pressured  General Attitude: Cooperative  Appearance/Hygiene: Disheveled    Thought Process  Coherency: Other (Comment)  Content: Unremarkable  Delusions: Paranoid  Hallucination: None  Judgment/Insight: Poor  Confusion: None  Cognition: Follows commands    Sleep Pattern  Sleep Pattern: Difficulty falling asleep    Risk Factors  Self Harm/Suicidal Ideation Plan: Patient denies  Previous Self  Harm/Suicidal Plans: none  Risk Factors: None    Violence Risk Assessment  Assessment of Violence: None noted  Thoughts of Harm to Others: No    Ability to Assess Risk Screen  Risk Screen - Ability to Assess: Able to be screened  Ask Suicide-Screening Questions  1. In the past few weeks, have you wished you were dead?: No  2. In the past few weeks, have you felt that you or your family would be better off if you were dead?: No  3. In the past week, have you been having thoughts about killing yourself?: No  4. Have you ever tried to kill yourself?: No  5. Are you having thoughts of killing yourself right now?: No  Calculated Risk Score: No intervention is necessary  [place] Suicide Severity Rating Scale (Screener/Recent Self-Report)  1. Wish to be Dead (Past 1 Month): No  2. Non-Specific Active Suicidal Thoughts (Past 1 Month): No  6. Suicidal Behavior (Lifetime): No  Calculated C-SSRS Risk Score (Lifetime/Recent): No Risk Indicated  Step 1: Risk Factors  Current & Past Psychiatric Dx: Mood disorder, Psychotic disorder  Presenting Symptoms: Anxiety and/or panic, Hopelessness or despair  Precipitants/Stressors: Triggering events leading to humiliation, shame, and/or despair (e.g. loss of relationship, financial or health status) (real or anticipated)  Change in Treatment: Change in provider or treament (i.e., medications, psychotherapy, milieu)  Access to Lethal Methods : No  Step 2: Protective Factors   Protective Factors Internal: Identifies reasons for living  Protective Factors External: Cultural, spiritual and/or moral attitudes against suicide  Step 3: Suicidal Ideation Intensity  How Many Times Have You Had These Thoughts: Less than once a week  When You Have the Thoughts How Long do They Last : Fleeting - few seconds or minutes  Could/Can You Stop Thinking About Killing Yourself or Wanting to Die if You Want to: Does not attempt to control thoughts  Are There Things - Anyone or Anything - That Stopped You  "From Wanting to Die or Acting on: Does not apply  What Sort of Reasons Did You Have For Thinking About Wanting to Die or Killing Yourself: Does not apply  Total Score: 2  Step 5: Documentation  Risk Level: Low suicide risk (Patient is low risk. Dr. Lloyd agrees.)    Psychiatric Impression and Plan of Care  Assessment and Plan: Patient is a 43 year old male with Anxiety and Unspecified Psychosis. He came to the ED with delusions and paranoia. The patient reports he has not slept for three days. He states the \"[place] mafia\" is trying to kill him and \"people have been trying to kill me since I was 13-14 years old\". The patient has poor eye contact, insight and judgement. He moved to the area one year ago from [place]. He works from home. Recently he rented a new home and is having stressors there. The patient appears confused, flight of ideas, disorganized and pressured speech. A discussion took place with his mother. She explained last year the patient was admitted to a psychiatric unit twice in a month for a similar presentation. She confirmed the patient has \"not been making sense\". She shared he has been stressed, not sleeping and she is unsure if he is taking his medications. The patient denied any Suicidal or homicidal ideation. He is low risk to self.  Specific Resources Provided to Patient: none  CM Notified: no  PHP/IOP Recommended: none  Specific Information Provided for PHP/IOP: davi  Plan Comments: inpatient    Outcome/Disposition  Patient's Perception of Outcome Achieved: n/a  Assessment, Recommendations and Risk Level Reviewed with: inpatient.  Contact Name: Sumi Rivas  Contact Number(s): [phone]  Contact Relationship: mother  EPAT Assessment Completed Date: 09/26/24  EPAT Assessment Completed Time: 0917      "

## 2024-09-27 LAB
ANION GAP SERPL CALC-SCNC: 14 MMOL/L (ref 10–20)
AST SERPL W P-5'-P-CCNC: 73 U/L (ref 9–39)
ATRIAL RATE: 74 BPM
ATRIAL RATE: 89 BPM
BUN SERPL-MCNC: 8 MG/DL (ref 6–23)
CALCIUM SERPL-MCNC: 8.6 MG/DL (ref 8.6–10.3)
CHLORIDE SERPL-SCNC: 102 MMOL/L (ref 98–107)
CHOLEST SERPL-MCNC: 140 MG/DL (ref 0–199)
CHOLESTEROL/HDL RATIO: 3.5
CK SERPL-CCNC: 1758 U/L (ref 0–325)
CO2 SERPL-SCNC: 25 MMOL/L (ref 21–32)
CREAT SERPL-MCNC: 0.74 MG/DL (ref 0.5–1.3)
EGFRCR SERPLBLD CKD-EPI 2021: >90 ML/MIN/1.73M*2
GLUCOSE P FAST SERPL-MCNC: 121 MG/DL (ref 74–99)
GLUCOSE SERPL-MCNC: 121 MG/DL (ref 74–99)
HDLC SERPL-MCNC: 39.8 MG/DL
LDLC SERPL CALC-MCNC: 85 MG/DL
NON HDL CHOLESTEROL: 100 MG/DL (ref 0–149)
P AXIS: 66 DEGREES
P AXIS: 76 DEGREES
P OFFSET: 180 MS
P OFFSET: 191 MS
P ONSET: 135 MS
P ONSET: 137 MS
POTASSIUM SERPL-SCNC: 3.4 MMOL/L (ref 3.5–5.3)
PR INTERVAL: 162 MS
PR INTERVAL: 164 MS
Q ONSET: 216 MS
Q ONSET: 219 MS
QRS COUNT: 12 BEATS
QRS COUNT: 15 BEATS
QRS DURATION: 114 MS
QRS DURATION: 116 MS
QT INTERVAL: 446 MS
QT INTERVAL: 494 MS
QTC CALCULATION(BAZETT): 495 MS
QTC CALCULATION(BAZETT): 601 MS
QTC FREDERICIA: 478 MS
QTC FREDERICIA: 563 MS
R AXIS: 46 DEGREES
R AXIS: 47 DEGREES
SODIUM SERPL-SCNC: 138 MMOL/L (ref 136–145)
T AXIS: 55 DEGREES
T AXIS: 64 DEGREES
T OFFSET: 442 MS
T OFFSET: 463 MS
TRIGL SERPL-MCNC: 76 MG/DL (ref 0–149)
VENTRICULAR RATE: 74 BPM
VENTRICULAR RATE: 89 BPM
VLDL: 15 MG/DL (ref 0–40)

## 2024-09-27 PROCEDURE — 80048 BASIC METABOLIC PNL TOTAL CA: CPT | Performed by: INTERNAL MEDICINE

## 2024-09-27 PROCEDURE — 82550 ASSAY OF CK (CPK): CPT | Performed by: INTERNAL MEDICINE

## 2024-09-27 PROCEDURE — 80061 LIPID PANEL: CPT | Performed by: PSYCHIATRY & NEUROLOGY

## 2024-09-27 PROCEDURE — 99223 1ST HOSP IP/OBS HIGH 75: CPT | Performed by: PSYCHIATRY & NEUROLOGY

## 2024-09-27 PROCEDURE — 84450 TRANSFERASE (AST) (SGOT): CPT | Performed by: INTERNAL MEDICINE

## 2024-09-27 PROCEDURE — 82947 ASSAY GLUCOSE BLOOD QUANT: CPT | Performed by: PSYCHIATRY & NEUROLOGY

## 2024-09-27 PROCEDURE — 2500000001 HC RX 250 WO HCPCS SELF ADMINISTERED DRUGS (ALT 637 FOR MEDICARE OP): Performed by: PSYCHIATRY & NEUROLOGY

## 2024-09-27 PROCEDURE — 1240000001 HC SEMI-PRIVATE BH ROOM DAILY

## 2024-09-27 PROCEDURE — 36415 COLL VENOUS BLD VENIPUNCTURE: CPT | Performed by: PSYCHIATRY & NEUROLOGY

## 2024-09-27 PROCEDURE — 99221 1ST HOSP IP/OBS SF/LOW 40: CPT | Performed by: INTERNAL MEDICINE

## 2024-09-27 RX ORDER — ALPRAZOLAM 0.5 MG/1
0.5 TABLET ORAL ONCE
Status: COMPLETED | OUTPATIENT
Start: 2024-09-27 | End: 2024-09-27

## 2024-09-27 RX ORDER — OLANZAPINE 5 MG/1
5 TABLET ORAL 2 TIMES DAILY
COMMUNITY

## 2024-09-27 RX ORDER — ALPRAZOLAM 0.5 MG/1
0.5 TABLET ORAL DAILY PRN
Status: DISCONTINUED | OUTPATIENT
Start: 2024-09-27 | End: 2024-09-27

## 2024-09-27 RX ORDER — ALPRAZOLAM 0.25 MG/1
0.25 TABLET ORAL 2 TIMES DAILY PRN
Status: DISCONTINUED | OUTPATIENT
Start: 2024-09-27 | End: 2024-09-27

## 2024-09-27 RX ORDER — TRAZODONE HYDROCHLORIDE 100 MG/1
100 TABLET ORAL NIGHTLY
Status: DISCONTINUED | OUTPATIENT
Start: 2024-09-27 | End: 2024-09-29

## 2024-09-27 RX ORDER — OLANZAPINE 5 MG/1
5 TABLET ORAL NIGHTLY
Status: DISCONTINUED | OUTPATIENT
Start: 2024-09-27 | End: 2024-09-27

## 2024-09-27 RX ORDER — ALPRAZOLAM 0.5 MG/1
0.5 TABLET ORAL EVERY 24 HOURS
Status: DISCONTINUED | OUTPATIENT
Start: 2024-09-28 | End: 2024-09-28

## 2024-09-27 RX ORDER — ALPRAZOLAM 0.25 MG/1
0.5 TABLET ORAL NIGHTLY
COMMUNITY

## 2024-09-27 RX ORDER — ALPRAZOLAM 0.5 MG/1
0.5 TABLET ORAL EVERY 12 HOURS PRN
Status: DISCONTINUED | OUTPATIENT
Start: 2024-09-27 | End: 2024-09-27

## 2024-09-27 RX ADMIN — IBUPROFEN 600 MG: 600 TABLET, FILM COATED ORAL at 22:30

## 2024-09-27 RX ADMIN — TRAZODONE HYDROCHLORIDE 75 MG: 50 TABLET ORAL at 00:32

## 2024-09-27 RX ADMIN — TRAZODONE HYDROCHLORIDE 100 MG: 100 TABLET ORAL at 21:43

## 2024-09-27 RX ADMIN — Medication 5 MG: at 00:33

## 2024-09-27 RX ADMIN — Medication 5 MG: at 21:43

## 2024-09-27 RX ADMIN — ALPRAZOLAM 0.5 MG: 0.5 TABLET ORAL at 17:04

## 2024-09-27 RX ADMIN — NICOTINE POLACRILEX 4 MG: 2 GUM, CHEWING BUCCAL at 21:43

## 2024-09-27 RX ADMIN — NICOTINE POLACRILEX 4 MG: 2 GUM, CHEWING BUCCAL at 15:58

## 2024-09-27 SDOH — SOCIAL STABILITY: SOCIAL INSECURITY: HAVE YOU HAD ANY THOUGHTS OF HARMING ANYONE ELSE?: NO

## 2024-09-27 SDOH — SOCIAL STABILITY: SOCIAL INSECURITY: ABUSE: ADULT

## 2024-09-27 SDOH — SOCIAL STABILITY: SOCIAL INSECURITY: DOES ANYONE TRY TO KEEP YOU FROM HAVING/CONTACTING OTHER FRIENDS OR DOING THINGS OUTSIDE YOUR HOME?: NO

## 2024-09-27 SDOH — SOCIAL STABILITY: SOCIAL INSECURITY: HAS ANYONE EVER THREATENED TO HURT YOUR FAMILY OR YOUR PETS?: NO

## 2024-09-27 SDOH — SOCIAL STABILITY: SOCIAL INSECURITY: WERE YOU ABLE TO COMPLETE ALL THE BEHAVIORAL HEALTH SCREENINGS?: YES

## 2024-09-27 SDOH — SOCIAL STABILITY: SOCIAL INSECURITY: DO YOU FEEL UNSAFE GOING BACK TO THE PLACE WHERE YOU ARE LIVING?: NO

## 2024-09-27 SDOH — SOCIAL STABILITY: SOCIAL INSECURITY: ARE THERE ANY APPARENT SIGNS OF INJURIES/BEHAVIORS THAT COULD BE RELATED TO ABUSE/NEGLECT?: NO

## 2024-09-27 SDOH — SOCIAL STABILITY: SOCIAL INSECURITY: ARE YOU OR HAVE YOU BEEN THREATENED OR ABUSED PHYSICALLY, EMOTIONALLY, OR SEXUALLY BY ANYONE?: YES

## 2024-09-27 SDOH — SOCIAL STABILITY: SOCIAL INSECURITY: ARE YOU OR HAVE YOU BEEN THREATENED OR ABUSED PHYSICALLY, EMOTIONALLY, OR SEXUALLY BY ANYONE?: NO

## 2024-09-27 SDOH — SOCIAL STABILITY: SOCIAL INSECURITY: DO YOU FEEL ANYONE HAS EXPLOITED OR TAKEN ADVANTAGE OF YOU FINANCIALLY OR OF YOUR PERSONAL PROPERTY?: NO

## 2024-09-27 SDOH — SOCIAL STABILITY: SOCIAL INSECURITY: HAVE YOU HAD THOUGHTS OF HARMING ANYONE ELSE?: YES

## 2024-09-27 SDOH — SOCIAL STABILITY: SOCIAL INSECURITY: POSSIBLE ABUSE REPORTED TO:: OTHER (COMMENT)

## 2024-09-27 SDOH — HEALTH STABILITY: MENTAL HEALTH: EXPERIENCED ANY OF THE FOLLOWING LIFE EVENTS: PHYSICAL ASSAULT;ASSAULT WITH A WEAPON;DEATH OF FAMILY/FRIEND

## 2024-09-27 ASSESSMENT — ACTIVITIES OF DAILY LIVING (ADL): LACK_OF_TRANSPORTATION: NO

## 2024-09-27 ASSESSMENT — LIFESTYLE VARIABLES
HOW MANY STANDARD DRINKS CONTAINING ALCOHOL DO YOU HAVE ON A TYPICAL DAY: PATIENT DOES NOT DRINK
SUBSTANCE_ABUSE_PAST_12_MONTHS: YES
SKIP TO QUESTIONS 9-10: 1
AUDIT-C TOTAL SCORE: 0
HOW MANY STANDARD DRINKS CONTAINING ALCOHOL DO YOU HAVE ON A TYPICAL DAY: PATIENT DOES NOT DRINK
SUBSTANCE_ABUSE_PAST_12_MONTHS: YES
AUDIT-C TOTAL SCORE: 0
PRESCIPTION_ABUSE_PAST_12_MONTHS: NO
HOW OFTEN DO YOU HAVE A DRINK CONTAINING ALCOHOL: NEVER
PRESCIPTION_ABUSE_PAST_12_MONTHS: YES
HOW OFTEN DO YOU HAVE 6 OR MORE DRINKS ON ONE OCCASION: NEVER

## 2024-09-27 ASSESSMENT — PAIN SCALES - GENERAL
PAINLEVEL_OUTOF10: 0 - NO PAIN
PAINLEVEL_OUTOF10: 6
PAINLEVEL_OUTOF10: 0 - NO PAIN
PAINLEVEL_OUTOF10: 2

## 2024-09-27 ASSESSMENT — ENCOUNTER SYMPTOMS
FATIGUE: 1
NERVOUS/ANXIOUS: 1
APPETITE CHANGE: 0
DECREASED CONCENTRATION: 1
HYPERACTIVE: 1
ACTIVITY CHANGE: 1
MYALGIAS: 1
SLEEP DISTURBANCE: 1
UNEXPECTED WEIGHT CHANGE: 0
HALLUCINATIONS: 1

## 2024-09-27 ASSESSMENT — PAIN - FUNCTIONAL ASSESSMENT
PAIN_FUNCTIONAL_ASSESSMENT: 0-10

## 2024-09-27 ASSESSMENT — PATIENT HEALTH QUESTIONNAIRE - PHQ9
SUM OF ALL RESPONSES TO PHQ9 QUESTIONS 1 & 2: 0
1. LITTLE INTEREST OR PLEASURE IN DOING THINGS: NOT AT ALL
2. FEELING DOWN, DEPRESSED OR HOPELESS: NOT AT ALL

## 2024-09-27 ASSESSMENT — PAIN SCALES - WONG BAKER: WONGBAKER_NUMERICALRESPONSE: HURTS EVEN MORE

## 2024-09-27 ASSESSMENT — PAIN DESCRIPTION - ORIENTATION: ORIENTATION: UPPER

## 2024-09-27 ASSESSMENT — PAIN DESCRIPTION - LOCATION: LOCATION: BACK

## 2024-09-27 NOTE — CARE PLAN
"The patient's goals for the shift include \"Get settled in- but I won't be sleeping, I'm a night person\".    The clinical goals for the shift include \"get my heart and lung checked out-make sure I don't have CA\".    Over the shift, the patient did make progress toward the following goals.   Problem: Pain - Adult  Goal: Verbalizes/displays adequate comfort level or baseline comfort level  Outcome: Progressing     Problem: Infection - Adult  Goal: Absence of infection at discharge  Outcome: Progressing  Goal: Absence of infection during hospitalization  Outcome: Progressing  Goal: Absence of fever/infection during anticipated neutropenic period  Outcome: Progressing     Problem: Safety - Adult  Goal: Free from fall injury  Outcome: Progressing     Problem: Discharge Planning  Goal: Discharge to home or other facility with appropriate resources  Outcome: Progressing     Problem: Chronic Conditions and Co-morbidities  Goal: Patient's chronic conditions and co-morbidity symptoms are monitored and maintained or improved  Outcome: Progressing       "

## 2024-09-27 NOTE — ASSESSMENT & PLAN NOTE
Psychosis    Marvin Rivas is a 43 y.o. male with a past medical history of depression, PTSD and hypervigilance who was admitted to the hospital for psychosis.      Hypokalemia  -Improving and supplemented  -Etiology is unclear.  -Repeat potassium tomorrow.    Elevated anion gap  -Resolved    Mildly elevated blood sugar  -Please note that this is not a fasting blood sugar.  -Will monitor    Isolated AST elevation  -The rest of the LFTs are within normal limits  -Follow-up CPK level  -Follow-up repeat AST    Chronic neck and back pain  -Range of motion is intact  -No focal neurological deficit  -Patient states that he has been sleeping on the floor.  -Continue supportive care    Cannabis use/benzodiazepine use
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
## 2024-09-27 NOTE — CARE PLAN
"The patient's goals for the shift include \"Get settled in- but I won't be sleeping, I'm a night person\".    The clinical goals for the shift include \"get my heart and lung checked out-make sure I don't have CA\".    Over the shift, the patient did not make progress toward the following goals.   Problem: Pain - Adult  Goal: Verbalizes/displays adequate comfort level or baseline comfort level  9/27/2024 0013 by Cecelia Disla RN  Outcome: Progressing  9/27/2024 0012 by Cecelia Disla RN  Outcome: Progressing     Problem: Infection - Adult  Goal: Absence of infection at discharge  9/27/2024 0013 by Cecelia Disla RN  Outcome: Progressing  9/27/2024 0012 by Cecelia Disla RN  Outcome: Progressing  Goal: Absence of infection during hospitalization  9/27/2024 0013 by Cecelia Disla RN  Outcome: Progressing  9/27/2024 0012 by Cecelia Disla RN  Outcome: Progressing  Goal: Absence of fever/infection during anticipated neutropenic period  9/27/2024 0013 by Cecelia Disla RN  Outcome: Progressing  9/27/2024 0012 by Cecelia Disla RN  Outcome: Progressing     Problem: Safety - Adult  Goal: Free from fall injury  9/27/2024 0013 by Cecleia Disla RN  Outcome: Progressing  9/27/2024 0012 by Cecelia Disla RN  Outcome: Progressing     Problem: Discharge Planning  Goal: Discharge to home or other facility with appropriate resources  9/27/2024 0013 by Cecelia Disla RN  Outcome: Progressing  9/27/2024 0012 by Cecelia Disla RN  Outcome: Progressing     Problem: Chronic Conditions and Co-morbidities  Goal: Patient's chronic conditions and co-morbidity symptoms are monitored and maintained or improved  9/27/2024 0013 by Cecelia Disla RN  Outcome: Progressing  9/27/2024 0012 by Cecelia Disla RN  Outcome: Progressing     "

## 2024-09-27 NOTE — CARE PLAN
"The patient's goals for the shift include \"to have a nice and peaceful day\"    The clinical goals for the shift include maintain patient safety    Over the shift, the patient did not make progress toward the following goals. Barriers to progression include acuteness of illness. Recommendations to address these barriers include maintain Q 15 minute rounds for patient safety.    "

## 2024-09-27 NOTE — CARE PLAN
Problem: Community resource needs  Goal: Patient is receiving increased resource support to enhance ability to remain at home  Outcome: Progressing     Problem: Mental health issues  Goal: Stabilize adverse mental health factors affecting plan of care  Outcome: Progressing     Problem: Discharge Planning - Care Management  Goal: Discharge to post-acute care or home with appropriate resources  Outcome: Progressing  Flowsheets (Taken 9/27/2024 4289)  Discharge to post-acute care or home with appropriate resources:   Conduct assessment to determine patient/family and health care team treatment goals, and need for post-acute services based on payer coverage, community resources, and patient preferences, and barriers to discharge   Address psychosocial, clinical, and financial barriers to discharge as identified in assessment in conjunction with the patient/family and health care team   Communicate with and update the patient/family, physician, and health care team regarding progress on the discharge plan   Arrange appropriate transportation to post-acute venues   Arrange appropriate level of post-acute services according to patient’s   needs and preference and payer coverage in collaboration with the physician and health care team
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
## 2024-09-27 NOTE — ASSESSMENT & PLAN NOTE
Psychosis    Marvin Rivas is a 43 y.o. male with a past medical history of depression, PTSD and hypervigilance who was admitted to the hospital for psychosis.      Hypokalemia  -Improving  -Etiology is unclear.    Elevated anion gap  -Resolved    Mildly elevated blood sugar  -Please note that this is not a fasting blood sugar.  -Will monitor    Isolated AST elevation  -The rest of the LFTs are within normal limits  -Follow-up CPK level  -Follow-up repeat AST    Chronic neck and back pain  -Range of motion is intact  -No focal neurological deficit  -Patient states that he has been sleeping on the floor.  -Continue supportive care    Cannabis use/benzodiazepine use
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
## 2024-09-27 NOTE — GROUP NOTE
"Group Topic: Coping Skills   Group Date: 9/27/2024  Start Time: 0930  End Time: 1015  Facilitators: IRINEO Nur   Department: [facility]    Number of Participants: 5   Group Focus: anxiety, coping skills, depression, psychiatric education, and self-awareness  Treatment Modality: Recreational Therapy   Interventions utilized were Mental Health Maintenance Plan, Relaxation Beach Ball, exploration, patient education, story telling, and support  Purpose: coping skills, insight or knowledge, self-care, trigger / craving management, and other: warning signs    Name: Marvin Rivas YOB: 1980   MR: [id number]      Facilitator: Recreational Therapist  Level of Participation: active  Quality of Participation: appropriate/pleasant, cooperative, and engaged  Interactions with others: appropriate, gave feedback, asked thoughtful questions, and offered helpful suggestions  Mood/Affect: appropriate, brightens with interaction, and positive  Triggers (if applicable): n/a  Cognition: coherent/clear and goal directed  Progress: Moderate  Comments: Patients were provided with the \"Mental Health Maintenance Plan\" handout. We discussed five main areas including (triggers, warning signs, self-care practices, coping strategies, and navigating the path back to well-being). Through open dialogue and collaborative exploration, we discussed a path back to well-being and patients were prompted to record personal responses in each area.    Pt was fully engaged in morning session. He participated in ice breaker (relaxation beach ball) and completed group worksheet, sharing aloud appropriately. He was able to identify a variety of triggers including, \"corrupt politicians\" and \"people who hurt children\". We spent time talking about the idea of \"coping ahead of time\" to manage triggers as they arise and pt agreed this would be a useful technique.     Plan: continue with services      "

## 2024-09-27 NOTE — GROUP NOTE
"Group Topic: Art Creative   Group Date: 9/27/2024  Start Time: 1400  End Time: 1500  Facilitators: HANSEL NurS   Department: [facility] REH[facility]ERAP[facility]    Number of Participants: 3   Group Focus: leisure skills and social skills  Treatment Modality: Recreational Therapy  Interventions utilized were Sand Art, exploration and leisure development  Purpose: other: creative expression, leisure awareness, social engagement     Name: Marvin Rivas YOB: 1980   MR: [id number]      Facilitator: Recreational Therapist  Level of Participation: did not attend  Progress: None  Comments: Patients were gathered to participate in creating/designing \"Sand Art Pictures\". This activity works on creative expression, fine motor skills, following directions, positive social interaction, and leisure awareness.    Patient meeting with treatment team and unable to attend group activity at this time. Patient will continue to be provided with opportunities to enhance leisure skills and/or coping mechanisms.    Plan: continue with services      "
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
## 2024-09-27 NOTE — GROUP NOTE
"Group Topic: Leisure Skills   Group Date: 9/27/2024  Start Time: 1100  End Time: 1200  Facilitators: IRINEO Nur   Department: [facility] REHAB THERAPY VIRTUAL    Number of Participants: 2   Group Focus: leisure skills and social skills  Treatment Modality: Recreational Therapy   Interventions utilized were Pling Plong, exploration, leisure development, and mental fitness  Purpose: other: leisure awareness, fine/motor skills, cognitive skills, social engagement, heathy competition      Name: Marvin Rivas YOB: 1980   MR: [id number]      Facilitator: Recreational Therapist  Level of Participation: did not attend  Progress: None  Comments: Patients were gathered and encouraged to actively participate in the game \"Pling Pong\". This activity works on motor skills/coordinating movements, healthy competition, social interaction, and provides an overall pleasurable experience.    Patient declined invitation to group activity at this time. Patient will continue to be provided with opportunities to enhance leisure skills and/or coping mechanisms.    Plan: continue with services      "
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
## 2024-09-27 NOTE — NURSING NOTE
"Patient was assessed in patients room for privacy. Patient out on the unit and social with peers this shift. Rated anxiety 8-9/10 and depression 0/10. Denied SI/HI. Denied auditory/visual/other hallucinations. Patient complains of pain 9/10 in neck and lower back, pt declines pain medication stating \"I don't take that stuff, you know your body can build up a tolerance\" patient is hyper-verbal and requires redirection to the question. Patient has attended group therapy this shift. No medications are scheduled on this shift. No PRN medications administered this shift. Able to state positive coping skills such as \"stress balls, play video games, exercise\". Patient has been cooperative with staff. Q 15 minute checks to be maintained throughout shift for safety.    "

## 2024-09-28 PROCEDURE — 99232 SBSQ HOSP IP/OBS MODERATE 35: CPT | Performed by: PSYCHIATRY & NEUROLOGY

## 2024-09-28 PROCEDURE — 2500000001 HC RX 250 WO HCPCS SELF ADMINISTERED DRUGS (ALT 637 FOR MEDICARE OP): Performed by: PSYCHIATRY & NEUROLOGY

## 2024-09-28 PROCEDURE — 1240000001 HC SEMI-PRIVATE BH ROOM DAILY

## 2024-09-28 RX ORDER — IBUPROFEN 200 MG
1 TABLET ORAL DAILY PRN
Status: DISCONTINUED | OUTPATIENT
Start: 2024-09-28 | End: 2024-09-30 | Stop reason: HOSPADM

## 2024-09-28 RX ORDER — ALPRAZOLAM 0.25 MG/1
0.25 TABLET ORAL EVERY 12 HOURS PRN
Status: DISCONTINUED | OUTPATIENT
Start: 2024-09-28 | End: 2024-09-30 | Stop reason: HOSPADM

## 2024-09-28 RX ADMIN — NICOTINE POLACRILEX 4 MG: 2 GUM, CHEWING BUCCAL at 21:40

## 2024-09-28 RX ADMIN — TRAZODONE HYDROCHLORIDE 100 MG: 100 TABLET ORAL at 23:57

## 2024-09-28 RX ADMIN — HYDROXYZINE HYDROCHLORIDE 50 MG: 25 TABLET ORAL at 09:04

## 2024-09-28 RX ADMIN — IBUPROFEN 600 MG: 600 TABLET, FILM COATED ORAL at 23:58

## 2024-09-28 RX ADMIN — ALPRAZOLAM 0.5 MG: 0.5 TABLET ORAL at 13:02

## 2024-09-28 RX ADMIN — NICOTINE POLACRILEX 4 MG: 2 GUM, CHEWING BUCCAL at 13:02

## 2024-09-28 RX ADMIN — NICOTINE POLACRILEX 4 MG: 2 GUM, CHEWING BUCCAL at 17:57

## 2024-09-28 RX ADMIN — NICOTINE POLACRILEX 4 MG: 2 GUM, CHEWING BUCCAL at 09:05

## 2024-09-28 ASSESSMENT — PAIN - FUNCTIONAL ASSESSMENT
PAIN_FUNCTIONAL_ASSESSMENT: 0-10
PAIN_FUNCTIONAL_ASSESSMENT: 0-10

## 2024-09-28 ASSESSMENT — PAIN SCALES - GENERAL
PAINLEVEL_OUTOF10: 0 - NO PAIN
PAINLEVEL_OUTOF10: 5 - MODERATE PAIN
PAINLEVEL_OUTOF10: 0 - NO PAIN

## 2024-09-28 ASSESSMENT — PAIN DESCRIPTION - LOCATION: LOCATION: BACK

## 2024-09-28 NOTE — GROUP NOTE
Group Topic: Chemical Dependency - Dual Diagnosis   Group Date: 9/28/2024  Start Time: 1400  End Time: 1500  Facilitators: IRINEO Fernandes   Department: [facility] REHAB THERAPY VIRTUAL    Number of Participants: 6   Group Focus: anxiety, chemical dependency education, coping skills, and dual diagnosis  Treatment Modality: Recreation Therapy  Interventions utilized were: Anxiety and Addiction Information, exploration, mental fitness, patient education, story telling, and support  Purpose: coping skills, maladaptive thinking, feelings, irrational fears, insight or knowledge, relapse prevention strategies, and trigger / craving management    Name: Marvin Rivas YOB: 1980   MR: [id number]      Facilitator: Recreational Therapist  Level of Participation: active  Quality of Participation: appropriate/pleasant, cooperative, engaged, and initiates communication  Interactions with others:  playful/joking and appropriate  Mood/Affect: appropriate and positive  Triggers (if applicable): N/A  Cognition:  capable  Progress: Moderate  Comments: This session involved education on anxiety and addiction related illnesses.  We discussed types of anxiety illnesses, general information about addiction, coping strategies, and specific treatment options/methods. Participants were provided a recovery activity to complete independently that provides a questionnaire related to anxiety disorders. Participants were encouraged to share how they may be able to relate or utilize the information being discussed.      Patient attended the entire session and completed all desired group tasks.  He appeared interested and focused throughout. Patient shared his thoughts and identified areas he either agreed or thought differently.    Plan: continue with services

## 2024-09-28 NOTE — CARE PLAN
"The patient's goals for the shift include To go to group    The clinical goals for the shift include maintain safety    Over the shift, the patient did not make progress toward the following goals. Barriers to progression include \"nutrition\" . Recommendations to address these barriers include Supplement added.    "

## 2024-09-28 NOTE — PROGRESS NOTES
"Marvin Rivas is a 43 y.o. male on day 2 of admission presenting with Paranoia (Multi).    The patient was seen and examined alone in the hallway. I reviewed the chart and vital signs from overnight. I reviewed previous notes. I reviewed medications, administered overnight and their reported benefits or side effects. Patient reported by nursing to have slept 3.75 hours (unbroken). Patient reports he slept \"great\" and that this is very typical for him. He says he usually begins working around 11 pm and sleeps throughout the day. Patient says he probably ended up sleeping 7 hours because he napped throughout the day yesterday.  Patient denied drug side effects. The patient's nurse this morning reported, overnight, no agitated behavioral disturbances.     The patient reported feeling \"agitated due to the food situation, but otherwise great.\" He rates his anxiety a 5/10 today, once again due to the food. He states he will only eat sandwiches or burgers while he is here. He denies any depression, 0/10 and does not endorse suicidal or homicidal ideations. He denies auditory, visual, gustatory, olfactory or tactile hallucinations. He says he is less paranoid today, and does not actively believe there is anyone out to get him at this time.     He is content with his current medication regimen.        Mental Status Exam:   General: appropriately groomed and dressed in athletic attire.  Appearance: appears stated age.  Attitude: calm, cooperative.  Behavior: appropriate eye contact.  Motor Activity: no agitation or retardation. No EPS/TD, normal gait and station, normal muscle tone and bulk.  Speech: Pressured speech  Mood: \"Agitated with the food, otherwise great.\"  Affect: Labile  Thought Process: Circumstantial, perseverative, flight of ideas  Thought Content: does not currently endorse suicidal ideation,  denies homicidal ideations. Grandiose delusions elicited   Thought Perception: does not currently endorse auditory " hallucinations, denies visual hallucinations, no tactile, olfactory, or gustatory hallucinations elicited.   Cognition: alert, oriented to person, place and time. Memory difficult to assess, attention and concentration improved from yesterday, but still diminished.   Insight: poor-fair, as patient recognizes some symptoms of  illness and some need for recommended treatments.   Judgment: Impaired      Objective:    Continuous medications     PRN medications  PRN medications: alum-mag hydroxide-simeth, diphenhydrAMINE **OR** diphenhydrAMINE, docusate sodium, haloperidol **OR** haloperidol lactate, haloperidol **OR** haloperidol lactate, hydrOXYzine HCL, ibuprofen, melatonin, nicotine polacrilex, polyethylene glycol, psyllium     Depression Symptoms: sleep disturbance, fatigue, poor concentration  PTSD Symptoms: traumatic event history  Anxiety Symptoms: restlessness/feeling on edge, difficulty concentrating, sleep disturbance  Zoraida Symptoms: elevated mood, grandiosity, distractibility, pressured speech  Psychosis Symptoms: delusions  Vitals:      9/26/2024    11:50 PM 9/27/2024     5:46 AM 9/27/2024     5:47 AM 9/27/2024     6:35 PM 9/27/2024     6:38 PM 9/28/2024     4:12 AM 9/28/2024     4:13 AM   Vitals   Systolic 138 119 123 155 136 138 143   Diastolic 99 76 87 95 89 86 79   Heart Rate 85 81 91 81 106 69 92   Temp  36.3 °C (97.3 °F)  36.1 °C (97 °F)  36 °C (96.8 °F)           Labs:  Results for orders placed or performed during the hospital encounter of 09/26/24 (from the past 96 hour(s))   Glucose, Fasting   Result Value Ref Range    Glucose, Fasting 121 (H) 74 - 99 mg/dL   Lipid Panel   Result Value Ref Range    Cholesterol 140 0 - 199 mg/dL    HDL-Cholesterol 39.8 mg/dL    Cholesterol/HDL Ratio 3.5     LDL Calculated 85 <=99 mg/dL    VLDL 15 0 - 40 mg/dL    Triglycerides 76 0 - 149 mg/dL    Non HDL Cholesterol 100 0 - 149 mg/dL   Basic metabolic panel   Result Value Ref Range    Glucose 121 (H) 74 - 99 mg/dL     Sodium 138 136 - 145 mmol/L    Potassium 3.4 (L) 3.5 - 5.3 mmol/L    Chloride 102 98 - 107 mmol/L    Bicarbonate 25 21 - 32 mmol/L    Anion Gap 14 10 - 20 mmol/L    Urea Nitrogen 8 6 - 23 mg/dL    Creatinine 0.74 0.50 - 1.30 mg/dL    eGFR >90 >60 mL/min/1.73m*2    Calcium 8.6 8.6 - 10.3 mg/dL   Creatine Kinase   Result Value Ref Range    Creatine Kinase 1,758 (H) 0 - 325 U/L   AST   Result Value Ref Range    AST 73 (H) 9 - 39 U/L          Assessment Plan of Care:  PTSD  Generalized anxiety disorder, chronic   Hypomanic (rule out bipolar, schizoaffective)  Medical marijuana card ([place] and [place]), uses daily for PTSD        - Continue Trazodone 100 mg at bedtime   - Continue Melatonin 5 mg at bedtime   - Continue to hold Zyprexa due to noncompliance  - Continue Xanax 0.5 mg with lunch  - Watch over the weekend, collateral for the brother         TAINA Benz-S2

## 2024-09-28 NOTE — CARE PLAN
"The patient's goals for the shift include \"To get through the night\"    The clinical goals for the shift include Maintain safe environment for patient and medication compliance    Marvin slept at short intervals during the night.  When awake, he was repeatedly asking us for food, which we did not have then saying he was not going to eat his breakfast because \"it's not right, that food.\"  "

## 2024-09-28 NOTE — PROGRESS NOTES
"Marvin Rivas is a 43 y.o. male on day 2 of admission presenting with Paranoia (Multi).    The patient was seen and examined alone in the hallway. I reviewed the chart and vital signs from overnight. I reviewed previous notes. I reviewed medications, administered overnight and their reported benefits or side effects. Patient reported by nursing to have slept 3.75 hours (unbroken). Patient reports he slept \"great\" and that this is very typical for him. He says he usually begins working around 11 pm and sleeps throughout the day. Patient says he probably ended up sleeping 7 hours because he napped throughout the day yesterday.  Patient denied drug side effects. The patient's nurse this morning reported, overnight, no agitated behavioral disturbances.     The patient reported feeling \"agitated due to the food situation, but otherwise great.\" He rates his anxiety a 5/10 today, once again due to the food. He states he will only eat sandwiches or burgers while he is here. He denies any depression, 0/10 and does not endorse suicidal or homicidal ideations. He denies auditory, visual, gustatory, olfactory or tactile hallucinations. He says he is less paranoid today, and does not actively believe there is anyone out to get him at this time.     He is content with his current medication regimen.        Mental Status Exam:   General: appropriately groomed and dressed in athletic attire.  Appearance: appears stated age.  Attitude: calm, cooperative.  Behavior: appropriate eye contact.  Motor Activity: no agitation or retardation. No EPS/TD, normal gait and station, normal muscle tone and bulk.  Speech: Pressured speech  Mood: \"Agitated with the food, otherwise great.\"  Affect: Labile  Thought Process: Circumstantial, perseverative, flight of ideas  Thought Content: does not currently endorse suicidal ideation,  denies homicidal ideations. Grandiose delusions elicited   Thought Perception: does not currently endorse auditory " hallucinations, denies visual hallucinations, no tactile, olfactory, or gustatory hallucinations elicited.   Cognition: alert, oriented to person, place and time. Memory difficult to assess, attention and concentration improved from yesterday, but still diminished.   Insight: poor-fair, as patient recognizes some symptoms of  illness and some need for recommended treatments.   Judgment: Impaired      Objective:    Continuous medications     PRN medications  PRN medications: ALPRAZolam, alum-mag hydroxide-simeth, diphenhydrAMINE **OR** diphenhydrAMINE, docusate sodium, haloperidol **OR** haloperidol lactate, haloperidol **OR** haloperidol lactate, hydrOXYzine HCL, ibuprofen, melatonin, nicotine, nicotine polacrilex, polyethylene glycol, psyllium     Depression Symptoms: sleep disturbance, fatigue, poor concentration  PTSD Symptoms: traumatic event history  Anxiety Symptoms: restlessness/feeling on edge, difficulty concentrating, sleep disturbance  Zoraida Symptoms: elevated mood, grandiosity, distractibility, pressured speech  Psychosis Symptoms: delusions  Vitals:      9/26/2024    11:50 PM 9/27/2024     5:46 AM 9/27/2024     5:47 AM 9/27/2024     6:35 PM 9/27/2024     6:38 PM 9/28/2024     4:12 AM 9/28/2024     4:13 AM   Vitals   Systolic 138 119 123 155 136 138 143   Diastolic 99 76 87 95 89 86 79   Heart Rate 85 81 91 81 106 69 92   Temp  36.3 °C (97.3 °F)  36.1 °C (97 °F)  36 °C (96.8 °F)           Labs:  Results for orders placed or performed during the hospital encounter of 09/26/24 (from the past 96 hour(s))   Glucose, Fasting   Result Value Ref Range    Glucose, Fasting 121 (H) 74 - 99 mg/dL   Lipid Panel   Result Value Ref Range    Cholesterol 140 0 - 199 mg/dL    HDL-Cholesterol 39.8 mg/dL    Cholesterol/HDL Ratio 3.5     LDL Calculated 85 <=99 mg/dL    VLDL 15 0 - 40 mg/dL    Triglycerides 76 0 - 149 mg/dL    Non HDL Cholesterol 100 0 - 149 mg/dL   Basic metabolic panel   Result Value Ref Range    Glucose  121 (H) 74 - 99 mg/dL    Sodium 138 136 - 145 mmol/L    Potassium 3.4 (L) 3.5 - 5.3 mmol/L    Chloride 102 98 - 107 mmol/L    Bicarbonate 25 21 - 32 mmol/L    Anion Gap 14 10 - 20 mmol/L    Urea Nitrogen 8 6 - 23 mg/dL    Creatinine 0.74 0.50 - 1.30 mg/dL    eGFR >90 >60 mL/min/1.73m*2    Calcium 8.6 8.6 - 10.3 mg/dL   Creatine Kinase   Result Value Ref Range    Creatine Kinase 1,758 (H) 0 - 325 U/L   AST   Result Value Ref Range    AST 73 (H) 9 - 39 U/L          Assessment Plan of Care:  PTSD  Generalized anxiety disorder, chronic   Hypomanic (rule out bipolar, schizoaffective)  Medical marijuana card ([place]), uses daily for PTSD        - Continue Trazodone 100 mg at bedtime   - Continue Melatonin 5 mg at bedtime   - Continue to hold Zyprexa due to noncompliance  - Continue Xanax 0.5 mg with lunch  - Watch over the weekend, collateral for the brother         Lucy Morales MD

## 2024-09-28 NOTE — GROUP NOTE
Group Topic: Gross Motor/Balance Skills   Group Date: 9/28/2024  Start Time: 1100  End Time: 1145  Facilitators: HANSEL FernandesS   Department: [facility] REHAB THERAPY VIRTUAL    Number of Participants: 8   Group Focus: Physical Leisure, Movement, leisure skills  Treatment Modality: Recreation Therapy  Interventions utilized were: Bocce Ball, leisure development  Purpose: Leisure Awareness, Physical Activity, Pleasurable Activity, Social Stimulation     Name: Marvin Rivas YOB: 1980   MR: [id number]      Facilitator: Recreational Therapist  Level of Participation: active  Quality of Participation: appropriate/pleasant, cooperative, and engaged  Interactions with others: appropriate  Mood/Affect: appropriate  Triggers (if applicable): N/A  Cognition:  capable  Progress: Moderate  Comments: This physical activity “Bocce Ball” involved coordinating movements, social interactions, healthy competition, leisure awareness, and a pleasurable experience.    Mr. Rivas attended the entire session and completed all desired physical group tasks.      Plan: continue with services

## 2024-09-28 NOTE — NURSING NOTE
Marvin remained out on the unit for the entire evening, often conversing with his peers.  He denies experiencing any anxiety or depression, he denies SI/HI/AVH and does complain of upper back pain and rates it as a 6/10.  He was compliant with taking all of his scheduled bedtime medications and was given Ibuprofen 600 mg, by mouth for relief of his back pain.

## 2024-09-28 NOTE — NURSING NOTE
Patient has been pleasant and cooperative so far this shift, he is social with peers, medication compliant. Xanax order changed from scheduled to PRN 0.25 mg q12hrs. Patient is aware he now has to ask for xanax in the morning. He denies anxiety, depression, SI/HI, and AH/VH. New order for double portions with all meals. He attends groups. 15 minute safety checks maintained.

## 2024-09-28 NOTE — GROUP NOTE
"Group Topic: Personal Responsibility   Group Date: 9/28/2024  Start Time: 0930  End Time: 1015  Facilitators: IRINEO Fernandes   Department: [facility] REHAB THERAPY VIRTUAL    Number of Participants: 6   Group Focus: communication, feeling awareness/expression, forgiveness, healthy friendships, personal responsibility, and self-awareness  Treatment Modality: Recreation Therapy   Interventions utilized were: Kind Words (activity), Kindness Worksheet, clarification, exploration, mental fitness, and support  Purpose: coping skills, maladaptive thinking, feelings, communication skills, and self-worth    Name: Marvin Rivas YOB: 1980   MR: [id number]      Facilitator: Recreational Therapist  Level of Participation: moderate, arrived late  Quality of Participation: cooperative, engaged, initiates communication, and superficial  Interactions with others:  playful, appropriate, and sarcastic  Mood/Affect: brightens with interaction and restless  Triggers (if applicable): N/A  Cognition:  capable, racing, paranoid  Progress: Moderate  Comments: Participants were asked to utilize letters to create words that associate with \"kindness\" and “empathy”. We discussed the meaning of the word and how it related to health, relationships, communication, and recovery. Patients were also provided a “kindness worksheet” which included ten tasks related to kindness, empathy, and healthier communication to work on. Each task provided an instructions section and reflection area for personal notes. Participants were assisted in understanding the tasks and then encouraged to complete in the future.    Patient attended most of the session. He minimally completed the team/small group activity and didn't appear to assist his teammates. Socially he was willing to share his thoughts and opinions about the \"kindness words\" and ways to practice kindness.     Plan: continue with services      "

## 2024-09-29 VITALS
DIASTOLIC BLOOD PRESSURE: 84 MMHG | HEIGHT: 74 IN | BODY MASS INDEX: 31.6 KG/M2 | RESPIRATION RATE: 18 BRPM | OXYGEN SATURATION: 97 % | HEART RATE: 83 BPM | SYSTOLIC BLOOD PRESSURE: 137 MMHG | TEMPERATURE: 97.5 F | WEIGHT: 246.25 LBS

## 2024-09-29 LAB
ALBUMIN SERPL BCP-MCNC: 4.2 G/DL (ref 3.4–5)
ALP SERPL-CCNC: 43 U/L (ref 33–120)
ALT SERPL W P-5'-P-CCNC: 41 U/L (ref 10–52)
ANION GAP SERPL CALC-SCNC: 11 MMOL/L (ref 10–20)
AST SERPL W P-5'-P-CCNC: 38 U/L (ref 9–39)
BILIRUB SERPL-MCNC: 0.6 MG/DL (ref 0–1.2)
BUN SERPL-MCNC: 11 MG/DL (ref 6–23)
CALCIUM SERPL-MCNC: 9.1 MG/DL (ref 8.6–10.3)
CHLORIDE SERPL-SCNC: 103 MMOL/L (ref 98–107)
CK SERPL-CCNC: 610 U/L (ref 0–325)
CO2 SERPL-SCNC: 28 MMOL/L (ref 21–32)
CREAT SERPL-MCNC: 0.72 MG/DL (ref 0.5–1.3)
EGFRCR SERPLBLD CKD-EPI 2021: >90 ML/MIN/1.73M*2
GLUCOSE SERPL-MCNC: 112 MG/DL (ref 74–99)
POTASSIUM SERPL-SCNC: 3.6 MMOL/L (ref 3.5–5.3)
PROT SERPL-MCNC: 6.2 G/DL (ref 6.4–8.2)
SODIUM SERPL-SCNC: 138 MMOL/L (ref 136–145)

## 2024-09-29 PROCEDURE — 36415 COLL VENOUS BLD VENIPUNCTURE: CPT | Performed by: NURSE PRACTITIONER

## 2024-09-29 PROCEDURE — 1240000001 HC SEMI-PRIVATE BH ROOM DAILY

## 2024-09-29 PROCEDURE — 99232 SBSQ HOSP IP/OBS MODERATE 35: CPT | Performed by: PSYCHIATRY & NEUROLOGY

## 2024-09-29 PROCEDURE — 82550 ASSAY OF CK (CPK): CPT | Performed by: NURSE PRACTITIONER

## 2024-09-29 PROCEDURE — 2500000001 HC RX 250 WO HCPCS SELF ADMINISTERED DRUGS (ALT 637 FOR MEDICARE OP): Performed by: PSYCHIATRY & NEUROLOGY

## 2024-09-29 PROCEDURE — 80053 COMPREHEN METABOLIC PANEL: CPT | Performed by: NURSE PRACTITIONER

## 2024-09-29 RX ORDER — TRAZODONE HYDROCHLORIDE 50 MG/1
50 TABLET ORAL NIGHTLY
Status: DISCONTINUED | OUTPATIENT
Start: 2024-09-29 | End: 2024-09-30 | Stop reason: HOSPADM

## 2024-09-29 RX ADMIN — NICOTINE POLACRILEX 4 MG: 2 GUM, CHEWING BUCCAL at 12:38

## 2024-09-29 RX ADMIN — NICOTINE POLACRILEX 4 MG: 2 GUM, CHEWING BUCCAL at 20:34

## 2024-09-29 RX ADMIN — HYDROXYZINE HYDROCHLORIDE 50 MG: 25 TABLET ORAL at 19:36

## 2024-09-29 RX ADMIN — Medication 5 MG: at 23:47

## 2024-09-29 RX ADMIN — NICOTINE POLACRILEX 4 MG: 2 GUM, CHEWING BUCCAL at 16:31

## 2024-09-29 RX ADMIN — NICOTINE POLACRILEX 4 MG: 2 GUM, CHEWING BUCCAL at 08:35

## 2024-09-29 RX ADMIN — Medication 5 MG: at 00:03

## 2024-09-29 RX ADMIN — TRAZODONE HYDROCHLORIDE 50 MG: 50 TABLET ORAL at 23:47

## 2024-09-29 RX ADMIN — ALPRAZOLAM 0.25 MG: 0.25 TABLET ORAL at 00:03

## 2024-09-29 RX ADMIN — ALPRAZOLAM 0.25 MG: 0.25 TABLET ORAL at 12:05

## 2024-09-29 RX ADMIN — HYDROXYZINE HYDROCHLORIDE 50 MG: 25 TABLET ORAL at 08:35

## 2024-09-29 ASSESSMENT — PAIN SCALES - GENERAL
PAINLEVEL_OUTOF10: 8
PAINLEVEL_OUTOF10: 4
PAINLEVEL_OUTOF10: 0 - NO PAIN

## 2024-09-29 ASSESSMENT — PAIN - FUNCTIONAL ASSESSMENT
PAIN_FUNCTIONAL_ASSESSMENT: 0-10

## 2024-09-29 ASSESSMENT — PAIN DESCRIPTION - DESCRIPTORS: DESCRIPTORS: ACHING;DULL

## 2024-09-29 ASSESSMENT — COLUMBIA-SUICIDE SEVERITY RATING SCALE - C-SSRS
1. SINCE LAST CONTACT, HAVE YOU WISHED YOU WERE DEAD OR WISHED YOU COULD GO TO SLEEP AND NOT WAKE UP?: NO
2. HAVE YOU ACTUALLY HAD ANY THOUGHTS OF KILLING YOURSELF?: NO
6. HAVE YOU EVER DONE ANYTHING, STARTED TO DO ANYTHING, OR PREPARED TO DO ANYTHING TO END YOUR LIFE?: NO

## 2024-09-29 NOTE — NURSING NOTE
Patient has been cooperative so far this shift, he refuses to eat breakfast, he did drink pro stat drink this morning, he has order for double portions. Anxiety  4, depression 2, denies SI/HI, and AH/VH, pain 8/10 back patient declined medication. CK 1758, repeat done today , Betina Perez notified, repeat labs for tomorrow, patient encouraged to increase fluid intake. 15 minute safety checks maintained.

## 2024-09-29 NOTE — GROUP NOTE
Group Topic: Spiritual/Devotional/Thought of the Day   Group Date: 9/29/2024  Start Time: 0930  End Time: 1015  Facilitators: HANSEL FernandesS   Department: [facility] REHAB THERAPY VIRTUAL    Number of Participants: 7   Group Focus: check in, clarity of thought, daily focus, goals, and self-awareness  Treatment Modality: Recreation Therapy  Interventions utilized were: 4 Beautiful Thoughts of Life (with four questions), exploration, story telling, and support  Purpose: insight or knowledge, self-worth, and self-care    Name: Marvin Rivas YOB: 1980   MR: [id number]      Facilitator: Recreational Therapist  Level of Participation: active  Quality of Participation: cooperative and oppositional  Interactions with others:  appropriate, questioning   Mood/Affect:  odd, joking at times, disagreeing   Triggers (if applicable): N/A  Cognition:  capable, poor judgement  Progress: Moderate  Comments: Participants were provided with a thought for the day which included four life tips. The reading prompted discussion and patients were encouraged to share their thoughts and opinions. CTRS included four questions to further utilize the reading, establish some goals for the day, and continue self-awareness practices.      Patient attended the entire session. He played cloudControl's advocate or was oppositional about much of the reading. He remained cooperative and appropriate with how he explained his points, but was often disagreeing with the suggestions or therapeutic value.     Plan: continue with services

## 2024-09-29 NOTE — PROGRESS NOTES
Marvin Rivas is a 43 y.o. male on day 3 of admission presenting with Paranoia (Multi).    The patient was seen and examined alone in the hallway. I reviewed the chart and vital signs from overnight. I reviewed previous notes. I reviewed medications, administered overnight and their reported benefits or side effects. Patient reported by nursing to have slept 3.75 hours (unbroken). Patient reports he flet daytime lingering affect of trazodone 100mg now day 3. We talked about lowering it. Noted untrusting, would not allow cleaning to clean room. Grandiose comments.     Mental Status Exam:   General: appropriately groomed and dressed in athletic attire.  Appearance: appears stated age.  Attitude: calm, cooperative.  Behavior: appropriate eye contact.  Motor Activity: no agitation or retardation. No EPS/TD, normal gait and station, normal muscle tone and bulk.  Speech: reduced rate, normal tone, rate  Mood: depression 2 and anxiety 4  Affect: calm most of the day  Thought Process: less circumstantial, perseverative, flight of ideas  Thought Content: does not currently endorse suicidal ideation,  denies homicidal ideations. Grandiose delusions elicited   Thought Perception: does not currently endorse auditory hallucinations, denies visual hallucinations, no tactile, olfactory, or gustatory hallucinations elicited.   Cognition: alert, oriented to person, place and time. Memory difficult to assess, attention and concentration improved from yesterday, but still diminished.   Insight: poor-fair, as patient recognizes some symptoms of  illness and some need for recommended treatments.   Judgment: Impaired    Objective:    Scheduled medications  traZODone, 50 mg, oral, Nightly     PRN medications: ALPRAZolam, alum-mag hydroxide-simeth, diphenhydrAMINE **OR** diphenhydrAMINE, docusate sodium, haloperidol **OR** haloperidol lactate, haloperidol **OR** haloperidol lactate, hydrOXYzine HCL, ibuprofen, melatonin, nicotine, nicotine  polacrilex, polyethylene glycol, psyllium     Vitals:      9/27/2024     6:38 PM 9/28/2024     4:12 AM 9/28/2024     4:13 AM 9/28/2024     6:15 PM 9/28/2024     6:17 PM 9/29/2024     5:12 AM 9/29/2024     5:13 AM   Vitals   Systolic 136 138 143 128 145 130 122   Diastolic 89 86 79 88 94 85 89   Heart Rate 106 69 92 70  70 82   Temp  36 °C (96.8 °F)  36.2 °C (97.2 °F) 36 °C (96.8 °F) 36.3 °C (97.3 °F)    Resp    16  18 18        Labs:  Results for orders placed or performed during the hospital encounter of 09/26/24 (from the past 96 hour(s))   Glucose, Fasting   Result Value Ref Range    Glucose, Fasting 121 (H) 74 - 99 mg/dL   Lipid Panel   Result Value Ref Range    Cholesterol 140 0 - 199 mg/dL    HDL-Cholesterol 39.8 mg/dL    Cholesterol/HDL Ratio 3.5     LDL Calculated 85 <=99 mg/dL    VLDL 15 0 - 40 mg/dL    Triglycerides 76 0 - 149 mg/dL    Non HDL Cholesterol 100 0 - 149 mg/dL   Basic metabolic panel   Result Value Ref Range    Glucose 121 (H) 74 - 99 mg/dL    Sodium 138 136 - 145 mmol/L    Potassium 3.4 (L) 3.5 - 5.3 mmol/L    Chloride 102 98 - 107 mmol/L    Bicarbonate 25 21 - 32 mmol/L    Anion Gap 14 10 - 20 mmol/L    Urea Nitrogen 8 6 - 23 mg/dL    Creatinine 0.74 0.50 - 1.30 mg/dL    eGFR >90 >60 mL/min/1.73m*2    Calcium 8.6 8.6 - 10.3 mg/dL   Creatine Kinase   Result Value Ref Range    Creatine Kinase 1,758 (H) 0 - 325 U/L   AST   Result Value Ref Range    AST 73 (H) 9 - 39 U/L        Assessment Plan of Care:  PTSD  Generalized anxiety disorder, chronic   Hypomanic (rule out bipolar, schizoaffective)  Medical marijuana card ([place]), uses daily for PTSD        - Continue Trazodone 100 mg at bedtime   - Continue Melatonin 5 mg at bedtime   - Continue to hold Zyprexa due to noncompliance  - Continue Xanax 0.5 mg with lunch  - Watch over the weekend, collateral for the brother     9/29: reduce trazodone to 50mg at bedtime  Collateral in am If stable dc in am      Lucy Morales MD

## 2024-09-29 NOTE — GROUP NOTE
"Group Topic: Art Creative   Group Date: 9/29/2024  Start Time: 1400  End Time: 1545  Facilitators: HANSEL FernandesS   Department: [facility] REHAB THERAPY VIRTUAL    Number of Participants: 11   Group Focus: art therapy, leisure skills, and relaxation  Treatment Modality: Recreation Therapy  Interventions utilized were: Painting Ceramic, Wood, and/or Paper mache projects, Music, leisure development, reminiscence, and support  Purpose: Leisure Awareness, Relaxation, Social Stimulation, coping skills    Name: Marvin Rivas YOB: 1980   MR: [id number]      Facilitator: Recreational Therapist  Level of Participation: moderate, left group  Quality of Participation: cooperative and tired  Interactions with others:  superficial  Mood/Affect:  calm  Triggers (if applicable): N/A  Cognition:  capable  Progress: Minimal  Comments: Session included creative activities which were offered for 115 minutes.  Projects included ceramic objects, paper mache boxes, and wood projects and the usage of acrylic paints.    Mr. Rivas attended most of the group but left early reporting he was tired. Patient worked on a paper mache box independently. Prior to group he requested speaking 1:1. During that time he discussed \"talking with 6 \" in relation to a \"[employer] documentary\" and some other \"Youtube\" documentary. He identified that he doesn't \"have any involvement with the [employer] show, but the  are still requesting him to complete information and provide medical records\". Patient wanted legal advice and answers to why \"law enforcement can't be trusted\" and \"why people are taking advantage of others\".     Plan: continue with services      "

## 2024-09-29 NOTE — GROUP NOTE
Group Topic: Community   Group Date: 9/29/2024  Start Time: 1100  End Time: 1200  Facilitators: HANSEL FernandesS   Department: [facility] REHAB THERAPY VIRTUAL    Number of Participants: 10   Group Focus: community group and leisure skills  Treatment Modality: Recreation Therapy  Interventions utilized were: Community Meeting, Catch Phrase (activity), clarification, orientation, and support  Purpose: Unit Suggestions, Orientation, Social/Cognitive Stimulation, communication skills    Name: Marvin Rivas YOB: 1980   MR: [id number]      Facilitator: Recreational Therapist  Level of Participation: moderate  Quality of Participation: cooperative and engaged  Interactions with others: appropriate, initiates conversation  Mood/Affect: appropriate  Triggers (if applicable): N/A  Cognition:  capable, loose  Progress: Moderate  Comments: This session included providing participants an opportunity to understand unit guidelines, rules, expectations, and provide a healthy environment to give suggestions for unit improvements. Community meeting questionnaire slips were passed out and collect. CTRS prioritized suggestions to discuss during this session.  The group also included peers verbalizing suggestions while CTRS recorded meeting minutes. The session included a leisure activity as well.    Patient attended the entire session and completed most desired group tasks. He provided some feedback about the unit and made one suggestion which was abstract and loosely related to topics being discussed. Patient appeared to enjoy the leisure activity.     Plan: continue with services

## 2024-09-29 NOTE — CARE PLAN
"The patient's goals for the shift include \"Have a good night sleep\"    The clinical goals for the shift include Medication compliance and Safety    Over the shift, the patient did not make progress toward the following goals. Barriers to progression include acuity of illness. Recommendations to address these barriers include medication compliance, safety, and education.    "

## 2024-09-29 NOTE — NURSING NOTE
Patient was assessed while out on the unit.  Patient was apologetic for his behavior of the night before when we did not have the type of food that he was wanting to eat and the kitchen was closed.  Tonight he rated anxiety 2, denied any depression, suicidal ideations, and hallucination.  Complained of chronic back and neck pain for which he received Ibuprofen 600 mg.  His coping skill is playing video games, his strength is that he is a people person, and his goal is the have a good nights sleep.  He also received Melatonin 5 mg and Xanax 0.25.  No further PRN medications were administered.  Will continue to monitor.       9/29/24   0555  Patient able to sleep fairly well through the night.  He stayed up late and then woke  by 3:00 a.m. Stayed up and rested in the front lounge.  No PRN medications were administered. No changes from previous assessment.

## 2024-09-29 NOTE — CARE PLAN
"The patient's goals for the shift include \"Have a good night sleep\"    The clinical goals for the shift include maintain safety    Over the shift, the patient did not make progress toward the following goals. Barriers to progression include medication dosage. Recommendations to address these barriers include medication management .    "

## 2024-09-30 VITALS
DIASTOLIC BLOOD PRESSURE: 88 MMHG | RESPIRATION RATE: 16 BRPM | OXYGEN SATURATION: 96 % | HEIGHT: 74 IN | WEIGHT: 246.25 LBS | TEMPERATURE: 96.8 F | BODY MASS INDEX: 31.6 KG/M2 | SYSTOLIC BLOOD PRESSURE: 121 MMHG | HEART RATE: 89 BPM

## 2024-09-30 PROBLEM — F17.200 NICOTINE DEPENDENCE: Status: ACTIVE | Noted: 2024-09-30

## 2024-09-30 PROBLEM — F13.20 BENZODIAZEPINE DEPENDENCE (MULTI): Status: ACTIVE | Noted: 2024-09-30

## 2024-09-30 PROBLEM — F43.12 CHRONIC POST-TRAUMATIC STRESS DISORDER: Status: ACTIVE | Noted: 2024-09-30

## 2024-09-30 PROCEDURE — 2500000001 HC RX 250 WO HCPCS SELF ADMINISTERED DRUGS (ALT 637 FOR MEDICARE OP): Performed by: PSYCHIATRY & NEUROLOGY

## 2024-09-30 PROCEDURE — 99239 HOSP IP/OBS DSCHRG MGMT >30: CPT | Performed by: PSYCHIATRY & NEUROLOGY

## 2024-09-30 RX ORDER — IBUPROFEN 200 MG
1 TABLET ORAL DAILY PRN
Qty: 38 PATCH | Refills: 0 | Status: SHIPPED | OUTPATIENT
Start: 2024-09-30 | End: 2024-11-07

## 2024-09-30 RX ORDER — TRAZODONE HYDROCHLORIDE 50 MG/1
50 TABLET ORAL NIGHTLY
Qty: 30 TABLET | Refills: 1 | Status: SHIPPED | OUTPATIENT
Start: 2024-09-30 | End: 2024-11-29

## 2024-09-30 RX ORDER — ACETAMINOPHEN 500 MG
5 TABLET ORAL NIGHTLY PRN
Qty: 30 TABLET | Refills: 0 | Status: SHIPPED | OUTPATIENT
Start: 2024-09-30 | End: 2024-10-30

## 2024-09-30 RX ORDER — DIPHENHYDRAMINE HCL 25 MG
4 CAPSULE ORAL EVERY 2 HOUR PRN
Qty: 100 EACH | Refills: 0 | Status: SHIPPED | OUTPATIENT
Start: 2024-09-30

## 2024-09-30 RX ADMIN — IBUPROFEN 600 MG: 600 TABLET, FILM COATED ORAL at 09:26

## 2024-09-30 RX ADMIN — HYDROXYZINE HYDROCHLORIDE 50 MG: 25 TABLET ORAL at 09:26

## 2024-09-30 RX ADMIN — ALPRAZOLAM 0.25 MG: 0.25 TABLET ORAL at 10:11

## 2024-09-30 RX ADMIN — NICOTINE POLACRILEX 4 MG: 2 GUM, CHEWING BUCCAL at 10:10

## 2024-09-30 ASSESSMENT — PAIN DESCRIPTION - LOCATION: LOCATION: NECK

## 2024-09-30 ASSESSMENT — ACTIVITIES OF DAILY LIVING (ADL): LACK_OF_TRANSPORTATION: NO

## 2024-09-30 ASSESSMENT — PAIN SCALES - GENERAL: PAINLEVEL_OUTOF10: 7

## 2024-09-30 ASSESSMENT — PAIN - FUNCTIONAL ASSESSMENT: PAIN_FUNCTIONAL_ASSESSMENT: 0-10

## 2024-09-30 NOTE — CARE PLAN
Problem: Community resource needs  Goal: Patient is receiving increased resource support to enhance ability to remain at home  Outcome: Met     Problem: Mental health issues  Goal: Stabilize adverse mental health factors affecting plan of care  Outcome: Met     Problem: Discharge Planning - Care Management  Goal: Discharge to post-acute care or home with appropriate resources  Outcome: Met     Problem: Mood/Psychosocial Concerns  Goal: Improve Sleep Habits  Outcome: Met     Problem: Access to Care Issue  Goal: Assess and Address Access Barriers  Outcome: Met     Problem: Coordination of Psychosocial Support Services Needed  Goal: Coordination of Services will be Obtained  Outcome: Met     Problem: Financial Problem  Goal: Assess and Address Specific Financial Obstacles Affecting Patient's Adderence to Plan of Care  Outcome: Met  Goal: STG - Patient will complete simple calculations for time/money management  Outcome: Met     Problem: Risk of Uncoordinated Care  Goal: Care will be Coordinated and Supported by a Multidisciplinary Team of Providers  Outcome: Met     Problem: Negative Experience, Conflict with, or Distrust of Providers and/or Health System  Goal: Plan to Address Patient Specific Negative Experience, Distrust, or Conflict with Providers and/or Health System  Outcome: Met
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
## 2024-09-30 NOTE — NURSING NOTE
Patient is out on the unit with other patients eating snack and socializing. Pt rated anxiety 5, depression 1, denied si/hi, denied a/v hallucinations. Pt requested to take his scheduled trazodone around midnight.
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
## 2024-09-30 NOTE — NURSING NOTE
"Patient was assessed this morning In the hallway away from peers for privacy. He presents as cooperative  and willing to  engage in conversation.  Patient is requesting Xanax at this time. He rates anxiety 7/10 and depression 1/10 with no SI/HI or AVH reported. Patient tells this nurse \" if you give me xanax my anxiety will be a 1\". He endorses neck discomfort of 7/10. He reports \" sleeping great last night\". Patients  goal for today was \" keep positive and go home\". His appetite has been good and he reports last BM on 9/29. He was given hydroxazine 50mg and ibuprofen for  neck discomfort. Patient became upset when this nurse gave him hydroxazine instead of xanax for anxiety. This nurse explained to patient that we will try this medication and encouraged him to utilize coping skills and if this medication wasn't effective we will then try xanax. He has a new order to discharge today. Discharge instructions and medications were reviewed with patint. Will continue to monitor for safety.  "

## 2024-09-30 NOTE — GROUP NOTE
"Group Topic: Goals   Group Date: 9/30/2024  Start Time: 0730  End Time: 0800  Facilitators: IRINEO Nur   Department: [facility] REHAB THERAPY VIRTUAL    Number of Participants: 7   Group Focus: check in, daily focus, and goals  Treatment Modality: Recreational Therapy   Interventions utilized were Daily Check In, Positive Intentions, exploration and support  Purpose: other: personal goals, gratitude, self-awareness     Name: Marvin Rivas YOB: 1980   MR: [id number]      Facilitator: Recreational Therapist  Level of Participation: moderate  Quality of Participation: appropriate/pleasant, cooperative, and engaged  Interactions with others: appropriate  Mood/Affect: appropriate and positive  Triggers (if applicable): n/a  Cognition: coherent/clear and goal directed  Progress: Moderate  Comments: Patients were provided with the \"Daily Check In\" handout for morning goal group. The handout includes 4 main sections - (current mood, gratitude, goals, and intentions for the day).    Pt was cooperative and pleasant this morning. He completed goal worksheet and shared aloud when prompted. He is discharge focused this morning, stating, \"I need to get back to work this week, I'm losing a ton of money.\"     Plan: continue with services      "

## 2024-09-30 NOTE — NURSING NOTE
Patient leaving at this time with brother. Discharge instructions instructions and medicating verified.

## 2024-09-30 NOTE — CARE PLAN
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, and get my business back going again\"    The clinical goals for the shift include medication compliance    Over the shift, the patient did make progress toward the following goals    Problem: Pain - Adult  Goal: Verbalizes/displays adequate comfort level or baseline comfort level  Outcome: Progressing     Problem: Infection - Adult  Goal: Absence of infection at discharge  Outcome: Progressing     Problem: Infection - Adult  Goal: Absence of infection during hospitalization  Outcome: Progressing     Problem: Safety - Adult  Goal: Free from fall injury  Outcome: Progressing     Problem: Mood/Psychosocial Concerns  Goal: Improve Sleep Habits  Outcome: Progressing     "

## 2024-09-30 NOTE — DISCHARGE INSTR - APPOINTMENTS
Outpatient Mental Health Appointments: (you will receive an email with instructions to access the Patient Portal for both Virtual appointments below)    Pat Maynard CNP, Psychiatry/Medication Management.  On  Date: 10/07/2024 at 2:00 PM (this is a VIRTUAL Appointment)  Through   [facility] Location,  [address] [place] [address] Rd.  [address], [place]  P. [phone];  F. [phone];    Maribel Bales, Trauma Therapist.  On  Date: 10/11/2024 at 11:30 AM (this is a VIRTUAL Appointment)  Through  [facility] Location,  [facility] [address]   [address],  [place], [place]  P. [phone];  F. [phone];    Per patient request, patient can call for a referral to a dietitian or nutritionist:  Appointment Referral Line: p. [phone].

## 2024-09-30 NOTE — GROUP NOTE
Group Topic: Music Therapy   Group Date: 9/30/2024  Start Time: 0940  End Time: 1020  Facilitators: Charmaine Smith   Department: [facility] EXPRESSIVE THER VIRTUAL    Number of Participants: 9   Group Focus: communication/socialization, expressive outlet, fine/gross motor, music therapy, and opportunity for choice/control  Treatment Modality: Music Therapy  Interventions Utilized were: active music engagement, education/instruction, and sharing/discussion    Participants were invited to engage with a variety of rhythm-based experiences, both with body percussion and percussion instruments. Opportunities were provided to share perceptions of the experience, dictate musical elements, and to practice active listening.    Name: Marvin Rivas YOB: 1980   MR: [id number]      Level of Participation:  intermittent, moderate  Quality of Participation: attentive and quiet  Interactions with others: appropriate  Mood/Affect: blunted  Cognition, Pre Treatment: attentive  Cognition, Post Treatment:  unable to assess  Progress: Minimal  Plan: continue with services    Pt attended group late, remained for one drumming experience, before leaving for the majority of the session. He returned briefly for a portion of the last drumming experience, but did not contribute to any group discussion.

## 2024-10-02 NOTE — SIGNIFICANT EVENT
Follow Up Phone Call    Outgoing phone call    Spoke to: Marvin Rivas Relationship:self   Phone number: [phone]      Outcome: missing/invalid number   No chief complaint on file.         Diagnosis:Not applicable